# Patient Record
Sex: FEMALE | Race: BLACK OR AFRICAN AMERICAN | Employment: FULL TIME | URBAN - METROPOLITAN AREA
[De-identification: names, ages, dates, MRNs, and addresses within clinical notes are randomized per-mention and may not be internally consistent; named-entity substitution may affect disease eponyms.]

---

## 2017-01-05 RX ORDER — METOPROLOL SUCCINATE 25 MG/1
TABLET, EXTENDED RELEASE ORAL
Qty: 90 TAB | Refills: 1 | Status: SHIPPED | OUTPATIENT
Start: 2017-01-05 | End: 2017-09-25 | Stop reason: SDUPTHER

## 2017-01-12 ENCOUNTER — OFFICE VISIT (OUTPATIENT)
Dept: CARDIOLOGY CLINIC | Age: 32
End: 2017-01-12

## 2017-01-12 VITALS
SYSTOLIC BLOOD PRESSURE: 122 MMHG | HEIGHT: 61 IN | HEART RATE: 70 BPM | DIASTOLIC BLOOD PRESSURE: 76 MMHG | WEIGHT: 164.06 LBS | OXYGEN SATURATION: 99 % | RESPIRATION RATE: 16 BRPM | BODY MASS INDEX: 30.98 KG/M2

## 2017-01-12 DIAGNOSIS — R00.2 PALPITATIONS: Primary | ICD-10-CM

## 2017-01-12 NOTE — PROGRESS NOTES
Casper Mondragon NP  Subjective/HPI:     Kourtney Kauffman is a 32 y.o. female is here for routine f/u. The patient denies chest pain/ shortness of breath, orthopnea, PND, LE edema, palpitations, syncope, presyncope or fatigue. Patient was feeling in her usual state of health up until last week where she developed a GI virus symptomatic with more diarrhea abdominal cramping and nausea no vomiting. She noted during the time of her illness she had moderate amounts of fatigue some chest tightness not related to exertional activities noted increasing palpitations and feelings of shortness of breath. In the last 5 days her viral syndrome has improved there is a less frequency of palpitations no longer reports shortness of breath or chest pain. She brings with her today at home ECG monitoring noting underlying sinus rhythm with PACs at times. During her GI  illness she held Lasix due to loose bowel movements; she had resume Lasix and felt that her shortness of breath had resolved. PCP Provider  Cassandra Smith MD  Past Medical History   Diagnosis Date    FH: arrhythmogenic right ventricular cardiomyopathy      ARVD in maternal grandfather    HTN (hypertension)       History reviewed. No pertinent past surgical history. Allergies   Allergen Reactions    Ceclor [Cefaclor] Hives      Family History   Problem Relation Age of Onset    Heart Disease Maternal Grandfather       Current Outpatient Prescriptions   Medication Sig    metoprolol succinate (TOPROL-XL) 25 mg XL tablet Take 1 tablet by mouth  nightly    cyanocobalamin (VITAMIN B-12) 500 mcg tablet Take 500 mcg by mouth daily.  cholecalciferol, vitamin D3, (VITAMIN D3) 2,000 unit tab Take 2,000 Units by mouth daily.  NORETHINDRONE-ETHINYL ESTRAD (ALYACEN 1/35, 28, PO) Take  by mouth.  melatonin (MELATONIN) 5 mg cap capsule Take 10 mg by mouth nightly.  Takes as needed    valsartan-hydrochlorothiazide (DIOVAN-HCT) 80-12.5 mg per tablet Take 1 Tab by mouth daily.  furosemide (LASIX) 40 mg tablet Take 80 mg by mouth daily. No current facility-administered medications for this visit. Vitals:    01/12/17 1546 01/12/17 1556   BP: 118/70 122/76   Pulse: 70    Resp: 16    SpO2: 99%    Weight: 164 lb 1 oz (74.4 kg)    Height: 5' 1\" (1.549 m)      Social History     Social History    Marital status:      Spouse name: N/A    Number of children: N/A    Years of education: N/A     Occupational History    Not on file. Social History Main Topics    Smoking status: Never Smoker    Smokeless tobacco: Not on file    Alcohol use No    Drug use: No    Sexual activity: Not on file     Other Topics Concern    Not on file     Social History Narrative       I have reviewed the nurses notes, vitals, problem list, allergy list, medical history, family, social history and medications. Review of Symptoms:    General: Pt denies excessive weight gain or loss. Pt is able to conduct ADL's  HEENT: Denies blurred vision, headaches, epistaxis and difficulty swallowing. Respiratory: Denies shortness of breath, NIEVES, wheezing or stridor. Cardiovascular: Denies precordial pain, + palpitations, denies during her GI edema or PND  Gastrointestinal: Denies poor appetite, indigestion, abdominal pain or blood in stool  Musculoskeletal: Denies pain or swelling from muscles or joints  Neurologic: Denies tremor, paresthesias, or sensory motor disturbance  Skin: Denies rash, itching or texture change. Physical Exam:      General: Well developed, in no acute distress, cooperative and alert  HEENT: No carotid bruits, no JVD, trach is midline. Neck Supple, PEERL, EOM intact. Heart:  Normal S1/S2 negative S3 or S4. Regular, no murmur, gallop or rub.   Respiratory: Clear bilaterally x 4, no wheezing or rales  Abdomen:   Soft, non-tender, no masses, bowel sounds are active.   Extremities:  No edema, normal cap refill, no cyanosis, atraumatic.    Neuro: A&Ox3, speech clear, gait stable. Skin: Skin color is normal. No rashes or lesions. Non diaphoretic  Vascular: 2+ pulses symmetric in all extremities    Cardiographics    ECG: Normal sinus rhythm  No results found for this or any previous visit. Cardiology Labs:  No results found for: CHOL, CHOLX, CHLST, [address], [id number], HDL, LDL, DLDL, LDLC, DLDLP, TGL, TGLX, TRIGL, [id number], TRIGP, CHHD, CHHDX    No results found for: NA, K, CL, CO2, AGAP, GLU, BUN, CREA, BUCR, GFRAA, GFRNA, CA, TBIL, TBILI, GPT, SGOT, AP, TP, ALB, GLOB, AGRAT, ALT        Assessment:     Assessment:     Frances Perdue was seen today for palpitations. Diagnoses and all orders for this visit:    Palpitations  -     AMB POC EKG ROUTINE W/ 12 LEADS, INTER & REP  -     2D ECHO COMPLETE ADULT (TTE) W OR WO CONTR; Future        ICD-10-CM ICD-9-CM    1. Palpitations R00.2 785.1 AMB POC EKG ROUTINE W/ 12 LEADS, INTER & REP      2D ECHO COMPLETE ADULT (TTE) W OR WO CONTR     Orders Placed This Encounter    AMB POC EKG ROUTINE W/ 12 LEADS, INTER & REP     Order Specific Question:   Reason for Exam:     Answer:   routine    2D ECHO COMPLETE ADULT (TTE) W OR WO CONTR     Standing Status:   Future     Standing Expiration Date:   7/12/2017     Order Specific Question:   Reason for Exam:     Answer:   palpitations     Order Specific Question:   Is Patient Pregnant? Answer:   No     Order Specific Question:   Contrast Enhancement (Bubble Study, Definity, Optison) may be used if criteria listed in established evidence-based protocol has been identified. Answer:   Yes        Plan:     Patient presents doing well and is stable from cardiac stand point. Patient reports feeling back to baseline health palpitations have significantly improved. Continue current beta-blocker dose. Family history of ARVD echo performed last year primary care office reviewed; for echo this year for surveillance.     Ayaan Warner NP      [facility]    1/12/2017         Agree with note as outlined by  NP. I confirm findings in history and physical exam. No additional findings noted. Agree with plan as outlined above.      Saundra Rider MD

## 2017-01-12 NOTE — PROGRESS NOTES
3 month follow up. Patient states had stomach virus was having chest pain, palpitations and sob. Since then still has some palpitations and chest pain, sob has resolved.

## 2017-02-02 ENCOUNTER — CLINICAL SUPPORT (OUTPATIENT)
Dept: CARDIOLOGY CLINIC | Age: 32
End: 2017-02-02

## 2017-02-02 DIAGNOSIS — R00.2 PALPITATIONS: ICD-10-CM

## 2017-02-07 ENCOUNTER — TELEPHONE (OUTPATIENT)
Dept: CARDIOLOGY CLINIC | Age: 32
End: 2017-02-07

## 2017-02-07 NOTE — TELEPHONE ENCOUNTER
----- Message from Hailey Estrada LPN sent at 2/7/6839  3:41 PM EST -----      ----- Message -----     From: Marcus Dior MD     Sent: 2/7/2017  10:25 AM       To: Chely Paulson LPN    Echo normal, thx.      Called pt,verified pt with two pt identifiers, told pt that her echo is normal. She needs to f/u in 6 months. She verbalized that she understood everything.

## 2017-09-25 RX ORDER — METOPROLOL SUCCINATE 25 MG/1
TABLET, EXTENDED RELEASE ORAL
Qty: 90 TAB | Refills: 2 | Status: SHIPPED | OUTPATIENT
Start: 2017-09-25 | End: 2018-07-23 | Stop reason: SDUPTHER

## 2017-11-30 ENCOUNTER — OFFICE VISIT (OUTPATIENT)
Dept: CARDIOLOGY CLINIC | Age: 32
End: 2017-11-30

## 2017-11-30 VITALS
HEIGHT: 61 IN | HEART RATE: 90 BPM | SYSTOLIC BLOOD PRESSURE: 124 MMHG | BODY MASS INDEX: 32.7 KG/M2 | RESPIRATION RATE: 20 BRPM | WEIGHT: 173.2 LBS | OXYGEN SATURATION: 98 % | DIASTOLIC BLOOD PRESSURE: 64 MMHG

## 2017-11-30 DIAGNOSIS — R00.2 PALPITATIONS: Primary | ICD-10-CM

## 2017-11-30 DIAGNOSIS — R60.9 EDEMA, UNSPECIFIED TYPE: ICD-10-CM

## 2017-11-30 RX ORDER — NORETHINDRONE AND ETHINYL ESTRADIOL 1 MG-35MCG
1 KIT ORAL DAILY
COMMUNITY
Start: 2017-10-05

## 2017-11-30 NOTE — PROGRESS NOTES
NAME:  Juana Lester   :   1985   MRN:   [id number]   PCP:  Nannette Bumpers, MD           Subjective: The patient is a 28y.o. year old female  who returns for a routine follow-up. Since the last visit, patient reports no change in exercise tolerance, chest pain, edema, medication intolerance, palpitations, shortness of breath, PND/orthopnea wheezing, sputum, syncope, dizziness or light headedness. Doing well. Past Medical History:   Diagnosis Date    FH: arrhythmogenic right ventricular cardiomyopathy     ARVD in maternal grandfather    HTN (hypertension)         ICD-10-CM ICD-9-CM    1. Palpitations R00.2 785.1 AMB POC EKG ROUTINE W/ 12 LEADS, INTER & REP   2. Edema, unspecified type R60.9 782.3 2D ECHO COMPLETE ADULT (TTE) W OR WO CONTR      Social History   Substance Use Topics    Smoking status: Never Smoker    Smokeless tobacco: Never Used    Alcohol use No      Family History   Problem Relation Age of Onset    Heart Disease Maternal Grandfather         Review of Systems  General: Pt denies excessive weight gain or loss. Pt is able to conduct ADL's  HEENT: Denies blurred vision, headaches, epistaxis and difficulty swallowing. Respiratory: Denies shortness of breath, NIEVES, wheezing or stridor. Cardiovascular: Denies precordial pain, palpitations, edema or PND  Gastrointestinal: Denies poor appetite, indigestion, abdominal pain or blood in stool  Musculoskeletal: Denies pain or swelling from muscles or joints  Neurologic: Denies tremor, paresthesias, or sensory motor disturbance  Skin: Denies rash, itching or texture change. Objective:       Vitals:    17 1550 17 1600   BP: 124/60 124/64   Pulse: 90    Resp: 20    SpO2: 98%    Weight: 173 lb 3.2 oz (78.6 kg)    Height: 5' 1\" (1.549 m)     Body mass index is 32.73 kg/(m^2). General PE  Mental Status - Alert. General Appearance - Not in acute distress.     Chest and Lung Exam   Inspection: Accessory muscles - No use of accessory muscles in breathing. Auscultation:   Breath sounds: - Normal.    Cardiovascular   Inspection: Jugular vein - Bilateral - Inspection Normal.  Palpation/Percussion:   Apical Impulse: - Normal.  Auscultation: Rhythm - Regular. Heart Sounds - S1 WNL and S2 WNL. No S3 or S4. Murmurs & Other Heart Sounds: Auscultation of the heart reveals - No Murmurs. Peripheral Vascular   Upper Extremity: Inspection - Bilateral - No Cyanotic nailbeds or Digital clubbing. Lower Extremity:   Palpation: Edema - Bilateral - No edema. Data Review:     EKG -EKG: normal EKG, normal sinus rhythm, unchanged from previous tracings. Medications reviewed  Current Outpatient Prescriptions   Medication Sig    PIRMELLA 1-35 mg-mcg tab Take 1 Tab by mouth daily.  metoprolol succinate (TOPROL-XL) 25 mg XL tablet TAKE 1 TABLET BY MOUTH  NIGHTLY    cholecalciferol, vitamin D3, (VITAMIN D3) 2,000 unit tab Take 2,000 Units by mouth daily.  furosemide (LASIX) 40 mg tablet Take 80 mg by mouth daily.  cyanocobalamin (VITAMIN B-12) 500 mcg tablet Take 500 mcg by mouth daily.  melatonin (MELATONIN) 5 mg cap capsule Take 10 mg by mouth nightly. Takes as needed    valsartan-hydrochlorothiazide (DIOVAN-HCT) 80-12.5 mg per tablet Take 1 Tab by mouth daily. No current facility-administered medications for this visit. Assessment:       ICD-10-CM ICD-9-CM    1. Palpitations R00.2 785.1 AMB POC EKG ROUTINE W/ 12 LEADS, INTER & REP   2. Edema, unspecified type R60.9 782.3 2D ECHO COMPLETE ADULT (TTE) W OR WO CONTR        Plan:     Patient presents doing well and stable from cardiac standpoint. Discussed prn lasix for edema. Echo to check her RV. F/u on genetic testing for RV dysplasia. Continue current care and follow up in 6 months.     Lenin Kate MD

## 2017-11-30 NOTE — PROGRESS NOTES
1. Have you been to the ER, urgent care clinic since your last visit? Hospitalized since your last visit? NO    2. Have you seen or consulted any other health care providers outside of the [address] [place] [address] since your last visit? Include any pap smears or colon screening. YES PCP. C/O HEART PALPITATIONS OFF AND ON WITH CHEST PAIN, SWELLING IN BLE, SOB OCCASIONALLY.

## 2017-11-30 NOTE — MR AVS SNAPSHOT
Visit Information Date & Time Provider Department Dept. Phone Encounter #  
 11/30/2017  4:00 PM [address] [facility], [address] [place] [address] [facility] [phone] [id number] Upcoming Health Maintenance Date Due DTaP/Tdap/Td series (1 - Tdap) 10/27/2006 PAP AKA CERVICAL CYTOLOGY 10/27/2006 Influenza Age 5 to Adult 8/1/2017 Allergies as of 11/30/2017  Review Complete On: 11/30/2017 By: Elisha Tim LPN Severity Noted Reaction Type Reactions Ceclor [Cefaclor]  07/15/2016    Hives Current Immunizations  Never Reviewed No immunizations on file. Not reviewed this visit You Were Diagnosed With   
  
 Codes Comments Palpitations    -  Primary ICD-10-CM: R00.2 ICD-9-CM: 785.1 Vitals BP Pulse Resp Height(growth percentile) Weight(growth percentile) SpO2  
 124/64 (BP 1 Location: Right arm, BP Patient Position: Sitting) 90 20 5' 1\" (1.549 m) 173 lb 3.2 oz (78.6 kg) 98% BMI OB Status Smoking Status 32.73 kg/m2 Having regular periods Never Smoker Vitals History BMI and BSA Data Body Mass Index Body Surface Area 32.73 kg/m 2 1.84 m 2 Preferred Pharmacy Pharmacy Name Phone [address] [facility], [address] [address] [place] [address] [address] [facility] [place] [place] [facility] Your Updated Medication List  
  
   
This list is accurate as of: 11/30/17  4:19 PM.  Always use your most recent med list.  
  
  
  
  
 furosemide 40 mg tablet Commonly known as:  LASIX Take 80 mg by mouth daily. melatonin 5 mg Cap capsule Take 10 mg by mouth nightly. Takes as needed  
  
 metoprolol succinate 25 mg XL tablet Commonly known as:  TOPROL-XL  
TAKE 1 TABLET BY MOUTH  NIGHTLY PIRMELLA 1-35 mg-mcg Tab Generic drug:  norethindrone-ethinyl estradiol Take 1 Tab by mouth daily. valsartan-hydroCHLOROthiazide 80-12.5 mg per tablet Commonly known as:  DIOVAN-HCT Take 1 Tab by mouth daily. VITAMIN B-12 500 mcg tablet Generic drug:  cyanocobalamin Take 500 mcg by mouth daily. VITAMIN D3 2,000 unit Tab Generic drug:  cholecalciferol (vitamin D3) Take 2,000 Units by mouth daily. We Performed the Following AMB POC EKG ROUTINE W/ 12 LEADS, INTER & REP [79939 CPT(R)] Introducing [facility]! Quirnio Poe introduces Quippi patient portal. Now you can access parts of your medical record, email your doctor's office, and request medication refills online. 1. In your internet browser, go to [email or web address] 2. Click on the First Time User? Click Here link in the Sign In box. You will see the New Member Sign Up page. 3. Enter your Quippi Access Code exactly as it appears below. You will not need to use this code after youve completed the sign-up process. If you do not sign up before the expiration date, you must request a new code. · Quippi Access Code: [email or web address] Expires: 2/28/2018  3:46 PM 
 
4. Enter the last four digits of your Social Security Number (xxxx) and Date of Birth (mm/dd/yyyy) as indicated and click Submit. You will be taken to the next sign-up page. 5. Create a Quippi ID. This will be your Quippi login ID and cannot be changed, so think of one that is secure and easy to remember. 6. Create a Quippi password. You can change your password at any time. 7. Enter your Password Reset Question and Answer. This can be used at a later time if you forget your password. 8. Enter your e-mail address. You will receive e-mail notification when new information is available in [address] 9. Click Sign Up. You can now view and download portions of your medical record. 10. Click the Download Summary menu link to download a portable copy of your medical information. If you have questions, please visit the Frequently Asked Questions section of the Quippi website.  Remember, Quippi is NOT to be used for urgent needs. For medical emergencies, dial 911. Now available from your iPhone and Android! Please provide this summary of care documentation to your next provider. Your primary care clinician is listed as Gwen Medrano. If you have any questions after today's visit, please call [phone].

## 2017-12-14 ENCOUNTER — CLINICAL SUPPORT (OUTPATIENT)
Dept: CARDIOLOGY CLINIC | Age: 32
End: 2017-12-14

## 2017-12-14 DIAGNOSIS — R60.9 EDEMA, UNSPECIFIED TYPE: ICD-10-CM

## 2018-10-26 ENCOUNTER — OFFICE VISIT (OUTPATIENT)
Dept: CARDIOLOGY CLINIC | Age: 33
End: 2018-10-26

## 2018-10-26 ENCOUNTER — HOSPITAL ENCOUNTER (EMERGENCY)
Age: 33
Discharge: STILL ADMITTED OR ADMINISTRATIVE | End: 2018-10-26
Attending: EMERGENCY MEDICINE
Payer: COMMERCIAL

## 2018-10-26 ENCOUNTER — HOSPITAL ENCOUNTER (OUTPATIENT)
Dept: GENERAL RADIOLOGY | Age: 33
Discharge: HOME | End: 2018-10-26
Payer: COMMERCIAL

## 2018-10-26 VITALS
OXYGEN SATURATION: 99 % | WEIGHT: 181.7 LBS | HEIGHT: 61 IN | DIASTOLIC BLOOD PRESSURE: 82 MMHG | RESPIRATION RATE: 16 BRPM | SYSTOLIC BLOOD PRESSURE: 124 MMHG | BODY MASS INDEX: 34.31 KG/M2 | HEART RATE: 83 BPM

## 2018-10-26 VITALS
OXYGEN SATURATION: 100 % | RESPIRATION RATE: 18 BRPM | DIASTOLIC BLOOD PRESSURE: 74 MMHG | HEIGHT: 61 IN | WEIGHT: 182.76 LBS | BODY MASS INDEX: 34.51 KG/M2 | TEMPERATURE: 98.6 F | HEART RATE: 72 BPM | SYSTOLIC BLOOD PRESSURE: 129 MMHG

## 2018-10-26 DIAGNOSIS — I50.31 ACUTE DIASTOLIC CHF (CONGESTIVE HEART FAILURE) (HCC): Primary | ICD-10-CM

## 2018-10-26 DIAGNOSIS — I50.31 ACUTE DIASTOLIC CHF (CONGESTIVE HEART FAILURE) (HCC): ICD-10-CM

## 2018-10-26 DIAGNOSIS — R00.2 PALPITATIONS: ICD-10-CM

## 2018-10-26 PROCEDURE — 71046 X-RAY EXAM CHEST 2 VIEWS: CPT

## 2018-10-26 PROCEDURE — 75810000275 HC EMERGENCY DEPT VISIT NO LEVEL OF CARE

## 2018-10-26 RX ORDER — IBUPROFEN 200 MG
TABLET ORAL
COMMUNITY

## 2018-10-26 RX ORDER — NORETHINDRONE ACETATE AND ETHINYL ESTRADIOL, AND FERROUS FUMARATE 1MG-20(24)
KIT ORAL
COMMUNITY

## 2018-10-26 NOTE — PROGRESS NOTES
1. Have you been to the ER, urgent care clinic since your last visit? Hospitalized since your last visit? Yes, for abd pain in July 2018 at [facility]. 2. Have you seen or consulted any other health care providers outside of the [address] [place] [address] since your last visit? Include any pap smears or colon screening.   No    Chief Complaint   Patient presents with    Palpitations     pt c/o palpitations x 2-1/2 wks with deep urge to cough, nonproductive    Shortness of Breath     x 2-3 wks    Chest Pain     pt c/o steady, nagging pain to chest x 1-1/2 wks with pain to back     Fatigue    Leg Swelling     bilateral    Weight Gain     pt reports weight gain of 5-10 lbs in the last 3 wks

## 2018-10-26 NOTE — PROGRESS NOTES
NAME:  Abbe Ferrari   :   1985   MRN:   [id number]   PCP:  Tessa Gamboa NP           Subjective: The patient is a 28y.o. year old female  who returns for a routine follow-up. Since the last visit, patient reports no change in exercise tolerance, chest pain,  medication intolerance, palpitations,  PND/orthopnea wheezing, sputum, syncope, dizziness or light headedness. C/o edema, SOB. No improvement with higher dose of lasix. Past Medical History:   Diagnosis Date    FH: arrhythmogenic right ventricular cardiomyopathy     ARVD in maternal grandfather    HTN (hypertension)         ICD-10-CM ICD-9-CM    1. Acute diastolic CHF (congestive heart failure) (HCC) I50.31 428.31 2D ECHO COMPLETE ADULT (TTE) W OR WO CONTR     428.0 XR CHEST PA LAT      METABOLIC PANEL, BASIC      NT-PRO BNP   2. Palpitations R00.2 785.1 AMB POC EKG ROUTINE W/ 12 LEADS, INTER & REP      Social History     Tobacco Use    Smoking status: Never Smoker    Smokeless tobacco: Never Used   Substance Use Topics    Alcohol use: No     Alcohol/week: 0.0 oz      Family History   Problem Relation Age of Onset    Heart Disease Maternal Grandfather         Review of Systems  General: Pt denies excessive weight gain or loss. Pt is able to conduct ADL's  HEENT: Denies blurred vision, headaches, epistaxis and difficulty swallowing. Respiratory: Denies shortness of breath, NIEVES, wheezing or stridor. Cardiovascular: Denies precordial pain, palpitations, edema or PND  Gastrointestinal: Denies poor appetite, indigestion, abdominal pain or blood in stool  Musculoskeletal: Denies pain or swelling from muscles or joints  Neurologic: Denies tremor, paresthesias, or sensory motor disturbance  Skin: Denies rash, itching or texture change.     Objective:       Vitals:    10/26/18 1046 10/26/18 1105   BP: 124/80 124/82   Pulse: 83    Resp: 16    SpO2: 99%    Weight: 181 lb 11.2 oz (82.4 kg)    Height: 5' 1\" (1.549 m)     Body mass index is 34.33 kg/m². General PE  Mental Status - Alert. General Appearance - Not in acute distress. Chest and Lung Exam   Inspection: Accessory muscles - No use of accessory muscles in breathing. Auscultation:   Breath sounds: - Normal.    Cardiovascular   Inspection: Jugular vein - Bilateral - Inspection Normal.  Palpation/Percussion:   Apical Impulse: - Normal.  Auscultation: Rhythm - Regular. Heart Sounds - S1 WNL and S2 WNL. No S3 or S4. Murmurs & Other Heart Sounds: Auscultation of the heart reveals - No Murmurs. Peripheral Vascular   Upper Extremity: Inspection - Bilateral - No Cyanotic nailbeds or Digital clubbing. Lower Extremity:   Palpation: Edema - Bilateral - No edema. Data Review:     EKG -EKG: normal EKG, normal sinus rhythm, unchanged from previous tracings. Medications reviewed  Current Outpatient Medications   Medication Sig    norethindrone-e.estradiol-iron (TAYTULLA) 1 mg-20 mcg (24)/75 mg (4) cap Take  by mouth.  ibuprofen (MOTRIN IB) 200 mg tablet Take  by mouth.  metoprolol succinate (TOPROL-XL) 25 mg XL tablet TAKE 1 TABLET BY MOUTH  NIGHTLY    furosemide (LASIX) 40 mg tablet Take 80 mg by mouth daily.  PIRMELLA 1-35 mg-mcg tab Take 1 Tab by mouth daily.  cyanocobalamin (VITAMIN B-12) 500 mcg tablet Take 500 mcg by mouth daily.  cholecalciferol, vitamin D3, (VITAMIN D3) 2,000 unit tab Take 2,000 Units by mouth daily.  melatonin 5 mg cap capsule Take 10 mg by mouth nightly. Takes as needed    valsartan-hydrochlorothiazide (DIOVAN-HCT) 80-12.5 mg per tablet Take 1 Tab by mouth daily. No current facility-administered medications for this visit. Assessment:       ICD-10-CM ICD-9-CM    1. Acute diastolic CHF (congestive heart failure) (HCC) I50.31 428.31 2D ECHO COMPLETE ADULT (TTE) W OR WO CONTR     428.0 XR CHEST PA LAT      METABOLIC PANEL, BASIC      NT-PRO BNP   2.  Palpitations R00.2 785.1 AMB POC EKG ROUTINE W/ 12 LEADS, INTER & REP        Plan:     Patient presents with what appears to be diastolic heart failure. Physical exam normal.  Advised to increase her lasix dose. Check echo,labs, xray. Advised to f/u with genetic testing at [facility] due to family h/o RV dysplasia. Continue current care and follow up in 2 weeks.     Augustus Sicard, MD

## 2018-10-27 LAB
BUN SERPL-MCNC: 11 MG/DL (ref 6–20)
BUN/CREAT SERPL: 12 (ref 9–23)
CALCIUM SERPL-MCNC: 9.2 MG/DL (ref 8.7–10.2)
CHLORIDE SERPL-SCNC: 103 MMOL/L (ref 96–106)
CO2 SERPL-SCNC: 25 MMOL/L (ref 20–29)
CREAT SERPL-MCNC: 0.93 MG/DL (ref 0.57–1)
GLUCOSE SERPL-MCNC: 88 MG/DL (ref 65–99)
NT-PROBNP SERPL-MCNC: 27 PG/ML (ref 0–130)
POTASSIUM SERPL-SCNC: 4.3 MMOL/L (ref 3.5–5.2)
SODIUM SERPL-SCNC: 141 MMOL/L (ref 134–144)

## 2018-11-01 ENCOUNTER — CLINICAL SUPPORT (OUTPATIENT)
Dept: CARDIOLOGY CLINIC | Age: 33
End: 2018-11-01

## 2018-11-01 DIAGNOSIS — I50.31 ACUTE DIASTOLIC CHF (CONGESTIVE HEART FAILURE) (HCC): ICD-10-CM

## 2018-11-14 NOTE — PROGRESS NOTES
Spoke with patient regarding ECHO results. Verified patient with two identifiers. Gabriele Marrero, pt will need a f/u apt with Dr. Kwame Ryan.   Thanks

## 2019-04-24 ENCOUNTER — TELEPHONE (OUTPATIENT)
Dept: CARDIOLOGY CLINIC | Age: 34
End: 2019-04-24

## 2019-04-24 NOTE — TELEPHONE ENCOUNTER
Pt called stating she has been having chest pain since yesterday afternoon. Pt states she has also had intermittent palpitations. Pt stated her chest pain is 8 out of 10 right now. Pt is at work and does not want to go to the ER due to financial constraints. Pt was instructed to go to [facility] if she can not go to the ER to be evaluated and get lab work to rule out PE and check troponin (per Elder Alonso). Pt agreed to do this.

## 2019-06-25 RX ORDER — METOPROLOL SUCCINATE 25 MG/1
TABLET, EXTENDED RELEASE ORAL
Qty: 90 TAB | Refills: 3 | Status: SHIPPED | OUTPATIENT
Start: 2019-06-25 | End: 2020-06-01 | Stop reason: SDUPTHER

## 2020-06-01 ENCOUNTER — VIRTUAL VISIT (OUTPATIENT)
Dept: CARDIOLOGY CLINIC | Age: 35
End: 2020-06-01

## 2020-06-01 VITALS
BODY MASS INDEX: 36.66 KG/M2 | DIASTOLIC BLOOD PRESSURE: 82 MMHG | WEIGHT: 194 LBS | HEART RATE: 90 BPM | SYSTOLIC BLOOD PRESSURE: 126 MMHG

## 2020-06-01 DIAGNOSIS — I50.33 ACUTE ON CHRONIC DIASTOLIC CONGESTIVE HEART FAILURE (HCC): Primary | ICD-10-CM

## 2020-06-01 DIAGNOSIS — R00.2 PALPITATIONS: ICD-10-CM

## 2020-06-01 NOTE — PROGRESS NOTES
Cardiology Telemedicine Encounter    Brianna Cano is a 29 y.o. female who was seen by synchronous (real-time) audio-video technology on 6/1/2020           Subjective/HPI:     Brianna Cano is a 29 y.o. female is here for routine follow-up via virtual visit. She has a PMHx of diastolic HF, palpitations. Has family h/o ARCD and her genetic testing was negative. C/o increasing SOB, edema, weight gain. Palpitations were better with beta blocker but now getting worse. PCP Provider  Brabara Deleon NP    Past Medical History:   Diagnosis Date    FH: arrhythmogenic right ventricular cardiomyopathy     ARVD in maternal grandfather    HTN (hypertension)         Allergies   Allergen Reactions    Ceclor [Cefaclor] Hives    Codeine Other (comments)     Lightheaded and dizziness        Outpatient Encounter Medications as of 6/1/2020   Medication Sig Dispense Refill    metoprolol succinate (TOPROL-XL) 25 mg XL tablet TAKE 1 TABLET BY MOUTH  NIGHTLY 90 Tab 3    PIRMELLA 1-35 mg-mcg tab Take 1 Tab by mouth daily.  furosemide (LASIX) 40 mg tablet Take 80 mg by mouth daily.  norethindrone-e.estradiol-iron (TAYTULLA) 1 mg-20 mcg (24)/75 mg (4) cap Take  by mouth.  ibuprofen (MOTRIN IB) 200 mg tablet Take  by mouth.  cyanocobalamin (VITAMIN B-12) 500 mcg tablet Take 500 mcg by mouth daily.  cholecalciferol, vitamin D3, (VITAMIN D3) 2,000 unit tab Take 2,000 Units by mouth daily.  melatonin 5 mg cap capsule Take 10 mg by mouth nightly. Takes as needed      valsartan-hydrochlorothiazide (DIOVAN-HCT) 80-12.5 mg per tablet Take 1 Tab by mouth daily. No facility-administered encounter medications on file as of 6/1/2020. Review of Symptoms:    Review of Systems   Constitutional: Negative. Negative for chills and fever. HENT: Negative. Negative for hearing loss. Respiratory: Negative. Negative for cough, hemoptysis and shortness of breath. Cardiovascular: Positive for palpitations and leg swelling. Negative for chest pain, orthopnea, claudication and PND. Gastrointestinal: Negative. Negative for nausea and vomiting. Musculoskeletal: Negative for myalgias. Skin: Negative. Negative for rash. Neurological: Negative. Negative for dizziness, loss of consciousness and headaches. Physical Exam:      General: Well developed, in no acute distress, cooperative and alert  HEENT: trach is midline. PEERL, EOM intact. Respiratory: No audible wheezing, no acute respiratory distress, normal effort of breathing  Extremities:  No cyanosis, atraumatic. No lower extremity edema. Neuro: A&Ox3, speech clear  Skin: Skin color is normal. No rashes or lesions. Non diaphoretic  Due to this being a TeleHealth evaluation, many elements of the physical examination are unable to be assessed. Cardiology Labs:    No results found for: FLP, CHOL, HDL, LDLC, VLDL, CHHD    No results found for: HBA1C, DUH8QQRC, CDS5SVBK    Lab Results   Component Value Date/Time    Sodium 141 10/26/2018 11:44 AM    Potassium 4.3 10/26/2018 11:44 AM    Chloride 103 10/26/2018 11:44 AM    CO2 25 10/26/2018 11:44 AM    Glucose 88 10/26/2018 11:44 AM    BUN 11 10/26/2018 11:44 AM    Creatinine 0.93 10/26/2018 11:44 AM    BUN/Creatinine ratio 12 10/26/2018 11:44 AM    GFR est AA 94 10/26/2018 11:44 AM    GFR est non-AA 82 10/26/2018 11:44 AM    Calcium 9.2 10/26/2018 11:44 AM          Assessment:       ICD-10-CM ICD-9-CM    1. Acute on chronic diastolic congestive heart failure (HCC) I50.33 428.33 ECHO ADULT COMPLETE     428.0    2. Palpitations R00.2 785.1 LOOP MONITOR        Plan:     1. Acute on chronic diastolic congestive heart failure (HCC)  Decompensated. Advised to increase lasix to 80 mg daily for 3-4 days and then 40 mg daily. May need to consider bumex if she does not tolerate lasix. Check echo. Consider stress test.  - ECHO ADULT COMPLETE; Future    2. Palpitations  Will evaluate with monitor before considering additional therapy.  - LOOP MONITOR; Future    F/u in 2-3 weeks after all tests. Chiqui Estrada MD        This visit was completed using [employer] Virtual Visit telemedicine services    Pursuant to the emergency declaration under the [facility], [facility] waiver authority and the Coronavirus Preparedness and [employer] Act, this Virtual Visit was conducted, with patient's consent, to reduce the patient's risk of exposure to COVID-19 and provide continuity of care for an established patient. Services were provided through a video synchronous discussion virtually to substitute for in-person clinic visit.

## 2020-06-01 NOTE — PROGRESS NOTES
Chief Complaint   Patient presents with    Follow-up    Palpitations    CHF       1. Have you been to the ER, urgent care clinic since your last visit? Hospitalized since your last visit? Yes     2. Have you seen or consulted any other health care providers outside of the [address] [place] [address] since your last visit? Include any pap smears or colon screening. Angel Paez GYN    Patient requesting refill of Metoprolol to mail order. She C/O SOB and chest discomfort with strenuous activity , palpitations and fluid build up.

## 2020-06-02 ENCOUNTER — CLINICAL SUPPORT (OUTPATIENT)
Dept: CARDIOLOGY CLINIC | Age: 35
End: 2020-06-02

## 2020-06-02 DIAGNOSIS — R00.2 PALPITATIONS: ICD-10-CM

## 2020-06-02 RX ORDER — METOPROLOL SUCCINATE 25 MG/1
25 TABLET, EXTENDED RELEASE ORAL DAILY
Qty: 90 TAB | Refills: 3 | Status: SHIPPED | OUTPATIENT
Start: 2020-06-02

## 2020-06-02 NOTE — PROGRESS NOTES
Patient received a 2 week event monitor. Instructions given verbally as well as an instruction sheet. Pt verbalized understanding.     [facility] Event Monitoring

## 2023-03-29 ENCOUNTER — TELEPHONE (OUTPATIENT)
Dept: INTERNAL MEDICINE CLINIC | Age: 38
End: 2023-03-29

## 2023-03-29 NOTE — TELEPHONE ENCOUNTER
Spoke w/ pt, informed pt Dr. Bertell Habermann is not accepting NP. Pt stated they will look at other offices before calling back to schedule with one of our other providers. Pt will call back when ready to schedule with our office.

## 2023-03-29 NOTE — TELEPHONE ENCOUNTER
----- Message from Dejah Jones sent at 3/29/2023  9:17 AM EDT -----  Subject: Appointment Request    Reason for Call: New Patient/New to Provider Appointment needed: New   Patient Request Appointment    QUESTIONS    Reason for appointment request? Requested Provider unavailable - Christian Hernandez     Additional Information for Provider? Patient wants to establish with Dr. Nolan Muniz because that is who her mom sees as her dr. Would she take her   as a new patient? When could she come in?  Please advise.  ---------------------------------------------------------------------------  --------------  Martinez GARCIA  [phone]; OK to leave message on voicemail  ---------------------------------------------------------------------------  --------------  SCRIPT ANSWERS  COVID Screen: [place] Record

## 2023-10-09 ENCOUNTER — OFFICE VISIT (OUTPATIENT)
Age: 38
End: 2023-10-09
Payer: COMMERCIAL

## 2023-10-09 VITALS
RESPIRATION RATE: 16 BRPM | DIASTOLIC BLOOD PRESSURE: 80 MMHG | HEART RATE: 80 BPM | HEIGHT: 60 IN | SYSTOLIC BLOOD PRESSURE: 130 MMHG | OXYGEN SATURATION: 100 % | WEIGHT: 200 LBS | BODY MASS INDEX: 39.27 KG/M2

## 2023-10-09 DIAGNOSIS — J45.20 MILD INTERMITTENT ASTHMA WITHOUT COMPLICATION: ICD-10-CM

## 2023-10-09 DIAGNOSIS — R63.5 UNINTENDED WEIGHT GAIN: ICD-10-CM

## 2023-10-09 DIAGNOSIS — I49.3 PVC'S (PREMATURE VENTRICULAR CONTRACTIONS): ICD-10-CM

## 2023-10-09 DIAGNOSIS — R00.2 PALPITATIONS: ICD-10-CM

## 2023-10-09 DIAGNOSIS — R60.0 EDEMA OF BOTH LOWER EXTREMITIES: Primary | ICD-10-CM

## 2023-10-09 DIAGNOSIS — Z82.49 FAMILY HISTORY OF ARRHYTHMOGENIC RIGHT VENTRICULAR CARDIOMYOPATHY: ICD-10-CM

## 2023-10-09 DIAGNOSIS — R06.02 SHORTNESS OF BREATH: ICD-10-CM

## 2023-10-09 PROCEDURE — 93000 ELECTROCARDIOGRAM COMPLETE: CPT | Performed by: INTERNAL MEDICINE

## 2023-10-09 PROCEDURE — 99204 OFFICE O/P NEW MOD 45 MIN: CPT | Performed by: INTERNAL MEDICINE

## 2023-10-09 RX ORDER — DROSPIRENONE 4 MG/1
TABLET, FILM COATED ORAL
COMMUNITY

## 2023-10-09 RX ORDER — ALBUTEROL SULFATE 1.25 MG/3ML
SOLUTION RESPIRATORY (INHALATION)
COMMUNITY
Start: 2023-08-20

## 2023-10-09 RX ORDER — ALBUTEROL SULFATE 90 UG/1
AEROSOL, METERED RESPIRATORY (INHALATION)
COMMUNITY
Start: 2023-08-20

## 2023-10-09 RX ORDER — FUROSEMIDE 40 MG/1
40 TABLET ORAL DAILY
Qty: 90 TABLET | Refills: 2 | Status: SHIPPED | OUTPATIENT
Start: 2023-10-09

## 2023-10-09 NOTE — PATIENT INSTRUCTIONS
You will be schedule for an Echocardiogram in the near future. Please take Lasix to 40 mg once a day. Get your blood work done in a month.

## 2023-10-09 NOTE — PROGRESS NOTES
normal. No rashes or lesions. Non diaphoretic  Vascular: 2+ pulses symmetric in all extremities, trace pitting edema    Labs:   CBC   No results found for: \"WBC\", \"HGB\", \"HCT\", \"MCV\", \"PLT\"    CMP  No results found for: \"NA\", \"K\", \"CL\", \"CO2\", \"BUN\", \"CREATININE\", \"GLUCOSE\", \"CALCIUM\", \"PROT\", \"LABALBU\", \"BILITOT\", \"ALKPHOS\", \"AST\", \"ALT\", \"LABGLOM\", \"GFRAA\", \"AGRATIO\", \"GLOB\"    INR  No results found for: \"INR\", \"PROTIME\"     LIPIDS  No results found for: \"LIPIDPAN\"      Lipids No results found for: \"CHOL\", \"TRIG\", \"HDL\", \"LDLCALC\", \"LABVLDL\", \"CHOLHDLRATIO\"      ECHO:  No results found for this or any previous visit. STRESS:  No results found for this or any previous visit. CARDIAC CATH  No results found for this or any previous visit. EKG:  NSR      Assessment:         Diagnosis Orders   1. Edema of both lower extremities        2. Palpitations  EKG 12 Lead      3. PVC's (premature ventricular contractions)            Orders Placed This Encounter   Procedures    EKG 12 Lead     Order Specific Question:   Reason for Exam?     Answer: Other        Plan:     Possible acute on chronic chronic HFpEF:  -Last echo I can see was in November 2018 with LVEF 55 to 60%, normal wall thickness, no significant valvular pathology  -Repeat echo  -If no signs of diastolic heart failure, venous insufficiency due to overweight is also on the differential diagnosis of her lower extremity edema  -Restart Lasix 40 mg daily which she took for many years, repeat BMP, magnesium, proBNP in about a month    Palpitations  Event monitor in June 2020 showed single PVCs.     Family history of arrhythmogenic right ventricular cardiomyopathy in her grandfather:  -Patient reports that her mother tested positive for an abnormal gene that can code for abnormal proteins leading to [address] mother is also my patient and was confirmed to have a suspicious gene, but has not showed any of the characteristics of ARVC herself  -The patient

## 2023-10-31 ENCOUNTER — TELEPHONE (OUTPATIENT)
Age: 38
End: 2023-10-31

## 2023-10-31 NOTE — TELEPHONE ENCOUNTER
----- Message from Jose D Marie MD sent at 10/29/2023  6:04 PM EDT -----  Heart is strong, without valve problems or signs of weak heart or stiff heart. Swelling likely relates to weight and/or sodium intake. There may be a component of leaky leg veins called venous insufficiency, and if very bothersome she could see a vascular surgeon for this. Work hard on healthy diet and exercise and sodium restriction, and if doing well, follow-up as planned. Message left for patient to call us back.

## 2023-11-10 ENCOUNTER — OFFICE VISIT (OUTPATIENT)
Age: 38
End: 2023-11-10

## 2023-11-10 VITALS
OXYGEN SATURATION: 100 % | BODY MASS INDEX: 39.31 KG/M2 | SYSTOLIC BLOOD PRESSURE: 133 MMHG | WEIGHT: 201.3 LBS | DIASTOLIC BLOOD PRESSURE: 91 MMHG | TEMPERATURE: 98.8 F | HEART RATE: 69 BPM

## 2023-11-10 DIAGNOSIS — B96.89 ACUTE BACTERIAL SINUSITIS: Primary | ICD-10-CM

## 2023-11-10 DIAGNOSIS — J01.90 ACUTE BACTERIAL SINUSITIS: Primary | ICD-10-CM

## 2023-11-10 RX ORDER — DOXYCYCLINE HYCLATE 100 MG
100 TABLET ORAL 2 TIMES DAILY
Qty: 14 TABLET | Refills: 0 | Status: SHIPPED | OUTPATIENT
Start: 2023-11-10 | End: 2023-11-17

## 2023-11-10 NOTE — PATIENT INSTRUCTIONS
Doxycycline as ordered, 2x per day for 7 days  Continue inhalers and breathing treatments for wheezing  OTC treatments: hot tea with honey, saline sinus rinses, warm compresses  Return if symptoms persist or worsen

## 2024-01-11 ENCOUNTER — OFFICE VISIT (OUTPATIENT)
Age: 39
End: 2024-01-11

## 2024-01-11 VITALS
WEIGHT: 198 LBS | RESPIRATION RATE: 20 BRPM | DIASTOLIC BLOOD PRESSURE: 82 MMHG | BODY MASS INDEX: 38.87 KG/M2 | HEART RATE: 88 BPM | OXYGEN SATURATION: 99 % | HEIGHT: 60 IN | SYSTOLIC BLOOD PRESSURE: 120 MMHG

## 2024-01-11 DIAGNOSIS — I49.3 PVC (PREMATURE VENTRICULAR CONTRACTION): ICD-10-CM

## 2024-01-11 DIAGNOSIS — R00.2 HEART PALPITATIONS: Primary | ICD-10-CM

## 2024-01-11 DIAGNOSIS — R60.0 LOWER EXTREMITY EDEMA: ICD-10-CM

## 2024-01-11 NOTE — PROGRESS NOTES
[place]: Jose  [phone]      History of Present Illness:  Ms. Rosales is a 39 yo F with family history of ARVC, but she did genetic testing and was negative for that, seen previously by Dr. Morel, but wanted to follow with me.  She also saw Dr. Teague in the past and at some point wore a heart monitor for palpitations that demonstrated PVCs.  At one point, she was on beta blocker for this, but is no longer.  A few months ago when she saw Dr. Davidson for fluid retention in her legs and hands they obtained an echocardiogram.  This was normal with normal systolic and diastolic function and no significant valvular disease and we discussed the results.  She has been taking Lasix for this.  She also has been undergoing testing for sleep apnea and had a sleep study on Monday and is awaiting the results.  She does have fatigue that they think may be related.  She does have some baseline shortness of breath she relates to asthma.  No exertional chest pain.  She is compensated on exam with clear lungs and just trace lower extremity edema.      Assessment and Plan:   1. Lower extremity edema.  No evidence of cardiac contribution and her echocardiogram demonstrated normal systolic and diastolic function.  She does have the results of the sleep apnea testing that she is awaiting.  Likely some degree of venous insufficiency and it is reasonable to continue Lasix.  Otherwise, talked about minimizing salt intake, which she already does, elevating her legs at rest and regular exercise and activity.  No additional cardiac evaluation is indicated at this time and we will have her follow back in one year.    2. Heart palpitations, PVCs.  These occur, but are not that bothersome and they are infrequent.  We talked about possible beta blocker or medical therapy if they became bothersome.  Medication would be indicated for symptom relief.  She did wear a Holter in 2020 that showed less than 1% PVCs/PACs.    3.

## 2024-06-06 ENCOUNTER — APPOINTMENT (OUTPATIENT)
Facility: HOSPITAL | Age: 39
End: 2024-06-06
Payer: COMMERCIAL

## 2024-06-06 ENCOUNTER — HOSPITAL ENCOUNTER (EMERGENCY)
Facility: HOSPITAL | Age: 39
Discharge: HOME | End: 2024-06-06
Attending: EMERGENCY MEDICINE
Payer: COMMERCIAL

## 2024-06-06 VITALS
TEMPERATURE: 98.3 F | WEIGHT: 207.23 LBS | HEART RATE: 78 BPM | OXYGEN SATURATION: 99 % | SYSTOLIC BLOOD PRESSURE: 132 MMHG | HEIGHT: 60 IN | RESPIRATION RATE: 18 BRPM | DIASTOLIC BLOOD PRESSURE: 85 MMHG | BODY MASS INDEX: 40.69 KG/M2

## 2024-06-06 DIAGNOSIS — K62.5 RECTAL BLEEDING: Primary | ICD-10-CM

## 2024-06-06 DIAGNOSIS — N20.0 KIDNEY STONE: ICD-10-CM

## 2024-06-06 DIAGNOSIS — N30.00 ACUTE CYSTITIS WITHOUT HEMATURIA: ICD-10-CM

## 2024-06-06 LAB
ALBUMIN SERPL-MCNC: 3.5 G/DL (ref 3.5–5)
ALBUMIN/GLOB SERPL: 0.8 (ref 1.1–2.2)
ALP SERPL-CCNC: 93 U/L (ref 45–117)
ALT SERPL-CCNC: 22 U/L (ref 12–78)
ANION GAP SERPL CALC-SCNC: 4 MMOL/L (ref 5–15)
APPEARANCE UR: ABNORMAL
AST SERPL-CCNC: ABNORMAL U/L (ref 15–37)
BACTERIA URNS QL MICRO: ABNORMAL /HPF
BASOPHILS # BLD: 0 K/UL (ref 0–0.1)
BASOPHILS NFR BLD: 1 % (ref 0–1)
BILIRUB SERPL-MCNC: 0.5 MG/DL (ref 0.2–1)
BILIRUB UR QL: NEGATIVE
BUN SERPL-MCNC: 10 MG/DL (ref 6–20)
BUN/CREAT SERPL: 10 (ref 12–20)
CALCIUM SERPL-MCNC: 9.4 MG/DL (ref 8.5–10.1)
CHLORIDE SERPL-SCNC: 106 MMOL/L (ref 97–108)
CO2 SERPL-SCNC: 25 MMOL/L (ref 21–32)
COLOR UR: ABNORMAL
COMMENT:: NORMAL
CREAT SERPL-MCNC: 0.98 MG/DL (ref 0.55–1.02)
DIFFERENTIAL METHOD BLD: NORMAL
EOSINOPHIL # BLD: 0 K/UL (ref 0–0.4)
EOSINOPHIL NFR BLD: 1 % (ref 0–7)
EPITH CASTS URNS QL MICRO: ABNORMAL /LPF
ERYTHROCYTE [DISTWIDTH] IN BLOOD BY AUTOMATED COUNT: 14 % (ref 11.5–14.5)
GLOBULIN SER CALC-MCNC: 4.3 G/DL (ref 2–4)
GLUCOSE SERPL-MCNC: 81 MG/DL (ref 65–100)
GLUCOSE UR STRIP.AUTO-MCNC: NEGATIVE MG/DL
HCG SERPL QL: NEGATIVE
HCG UR QL: NEGATIVE
HCT VFR BLD AUTO: 39.2 % (ref 35–47)
HEMOCCULT STL QL: NEGATIVE
HGB BLD-MCNC: 12.8 G/DL (ref 11.5–16)
HGB UR QL STRIP: NEGATIVE
IMM GRANULOCYTES # BLD AUTO: 0 K/UL (ref 0–0.04)
IMM GRANULOCYTES NFR BLD AUTO: 0 % (ref 0–0.5)
KETONES UR QL STRIP.AUTO: ABNORMAL MG/DL
LEUKOCYTE ESTERASE UR QL STRIP.AUTO: NEGATIVE
LIPASE SERPL-CCNC: 22 U/L (ref 13–75)
LYMPHOCYTES # BLD: 2 K/UL (ref 0.8–3.5)
LYMPHOCYTES NFR BLD: 44 % (ref 12–49)
MCH RBC QN AUTO: 27.6 PG (ref 26–34)
MCHC RBC AUTO-ENTMCNC: 32.7 G/DL (ref 30–36.5)
MCV RBC AUTO: 84.7 FL (ref 80–99)
MONOCYTES # BLD: 0.4 K/UL (ref 0–1)
MONOCYTES NFR BLD: 9 % (ref 5–13)
NEUTS SEG # BLD: 2.1 K/UL (ref 1.8–8)
NEUTS SEG NFR BLD: 45 % (ref 32–75)
NITRITE UR QL STRIP.AUTO: NEGATIVE
NRBC # BLD: 0 K/UL (ref 0–0.01)
NRBC BLD-RTO: 0 PER 100 WBC
PH UR STRIP: 6 (ref 5–8)
PLATELET # BLD AUTO: 205 K/UL (ref 150–400)
PMV BLD AUTO: 11.9 FL (ref 8.9–12.9)
POTASSIUM SERPL-SCNC: ABNORMAL MMOL/L (ref 3.5–5.1)
PROT SERPL-MCNC: 7.8 G/DL (ref 6.4–8.2)
PROT UR STRIP-MCNC: NEGATIVE MG/DL
RBC # BLD AUTO: 4.63 M/UL (ref 3.8–5.2)
RBC #/AREA URNS HPF: ABNORMAL /HPF (ref 0–5)
SODIUM SERPL-SCNC: 135 MMOL/L (ref 136–145)
SP GR UR REFRACTOMETRY: 1.02 (ref 1–1.03)
SPECIMEN HOLD: NORMAL
SPECIMEN HOLD: NORMAL
UROBILINOGEN UR QL STRIP.AUTO: 0.2 EU/DL (ref 0.2–1)
WBC # BLD AUTO: 4.6 K/UL (ref 3.6–11)
WBC URNS QL MICRO: ABNORMAL /HPF (ref 0–4)

## 2024-06-06 PROCEDURE — 83690 ASSAY OF LIPASE: CPT

## 2024-06-06 PROCEDURE — 85025 COMPLETE CBC W/AUTO DIFF WBC: CPT

## 2024-06-06 PROCEDURE — 36415 COLL VENOUS BLD VENIPUNCTURE: CPT

## 2024-06-06 PROCEDURE — 84703 CHORIONIC GONADOTROPIN ASSAY: CPT

## 2024-06-06 PROCEDURE — 74177 CT ABD & PELVIS W/CONTRAST: CPT

## 2024-06-06 PROCEDURE — 6370000000 HC RX 637 (ALT 250 FOR IP)

## 2024-06-06 PROCEDURE — 6360000004 HC RX CONTRAST MEDICATION: Performed by: RADIOLOGY

## 2024-06-06 PROCEDURE — 87086 URINE CULTURE/COLONY COUNT: CPT

## 2024-06-06 PROCEDURE — 82272 OCCULT BLD FECES 1-3 TESTS: CPT

## 2024-06-06 PROCEDURE — 6360000002 HC RX W HCPCS

## 2024-06-06 PROCEDURE — 99285 EMERGENCY DEPT VISIT HI MDM: CPT

## 2024-06-06 PROCEDURE — 81025 URINE PREGNANCY TEST: CPT

## 2024-06-06 PROCEDURE — 81001 URINALYSIS AUTO W/SCOPE: CPT

## 2024-06-06 PROCEDURE — 96374 THER/PROPH/DIAG INJ IV PUSH: CPT

## 2024-06-06 PROCEDURE — 2580000003 HC RX 258

## 2024-06-06 PROCEDURE — 80053 COMPREHEN METABOLIC PANEL: CPT

## 2024-06-06 RX ORDER — NITROFURANTOIN 25; 75 MG/1; MG/1
100 CAPSULE ORAL 2 TIMES DAILY
Qty: 10 CAPSULE | Refills: 0 | Status: SHIPPED | OUTPATIENT
Start: 2024-06-06 | End: 2024-06-11

## 2024-06-06 RX ORDER — KETOROLAC TROMETHAMINE 30 MG/ML
30 INJECTION, SOLUTION INTRAMUSCULAR; INTRAVENOUS
Status: COMPLETED | OUTPATIENT
Start: 2024-06-06 | End: 2024-06-06

## 2024-06-06 RX ORDER — 0.9 % SODIUM CHLORIDE 0.9 %
1000 INTRAVENOUS SOLUTION INTRAVENOUS ONCE
Status: COMPLETED | OUTPATIENT
Start: 2024-06-06 | End: 2024-06-06

## 2024-06-06 RX ORDER — NITROFURANTOIN 25; 75 MG/1; MG/1
100 CAPSULE ORAL
Status: COMPLETED | OUTPATIENT
Start: 2024-06-06 | End: 2024-06-06

## 2024-06-06 RX ADMIN — IOPAMIDOL 100 ML: 755 INJECTION, SOLUTION INTRAVENOUS at 15:02

## 2024-06-06 RX ADMIN — NITROFURANTOIN (MONOHYDRATE/MACROCRYSTALS) 100 MG: 75; 25 CAPSULE ORAL at 16:38

## 2024-06-06 RX ADMIN — SODIUM CHLORIDE 1000 ML: 9 INJECTION, SOLUTION INTRAVENOUS at 14:41

## 2024-06-06 RX ADMIN — KETOROLAC TROMETHAMINE 30 MG: 30 INJECTION, SOLUTION INTRAMUSCULAR at 14:41

## 2024-06-06 ASSESSMENT — PAIN DESCRIPTION - DESCRIPTORS
DESCRIPTORS: CRAMPING;ACHING
DESCRIPTORS: CRAMPING

## 2024-06-06 ASSESSMENT — PAIN DESCRIPTION - LOCATION
LOCATION: ABDOMEN
LOCATION: ABDOMEN

## 2024-06-06 ASSESSMENT — PAIN SCALES - GENERAL
PAINLEVEL_OUTOF10: 7
PAINLEVEL_OUTOF10: 5

## 2024-06-06 ASSESSMENT — ENCOUNTER SYMPTOMS
ANAL BLEEDING: 1
NAUSEA: 0
SHORTNESS OF BREATH: 0
ABDOMINAL PAIN: 1
VOMITING: 0

## 2024-06-06 ASSESSMENT — PAIN - FUNCTIONAL ASSESSMENT
PAIN_FUNCTIONAL_ASSESSMENT: 0-10
PAIN_FUNCTIONAL_ASSESSMENT: PREVENTS OR INTERFERES SOME ACTIVE ACTIVITIES AND ADLS
PAIN_FUNCTIONAL_ASSESSMENT: ACTIVITIES ARE NOT PREVENTED

## 2024-06-06 ASSESSMENT — PAIN DESCRIPTION - ORIENTATION
ORIENTATION: RIGHT;LEFT;MID;LOWER
ORIENTATION: RIGHT;LEFT

## 2024-06-06 NOTE — ED TRIAGE NOTES
Patient arrives to ED reports rectal bleeding yesterday, patient denies trying to have a BM at the time. Also reports abdominal cramping and lethargy.

## 2024-06-06 NOTE — DISCHARGE INSTRUCTIONS
Take the antibiotic as instructed and complete full course.     Follow-up closely with your primary care provider and a GI provider for reevaluation. Call for appointment as soon as possible.

## 2024-06-06 NOTE — ED PROVIDER NOTES
Appearance HAZY (*)     Ketones, Urine TRACE (*)     BACTERIA, URINE 3+ (*)     All other components within normal limits   URINE CULTURE HOLD SAMPLE   CULTURE, URINE   CBC WITH AUTO DIFFERENTIAL   OCCULT BLOOD, FECAL   LIPASE   EXTRA TUBES HOLD   HCG, SERUM, QUALITATIVE   POC PREGNANCY UR-QUAL   POC PREGNANCY UR-QUAL       All other labs were within normal range or not returned as of this dictation.    EMERGENCY DEPARTMENT COURSE and DIFFERENTIAL DIAGNOSIS/MDM:   Vitals:    Vitals:    06/06/24 1154   BP: 132/85   Pulse: 78   Resp: 18   Temp: 98.3 °F (36.8 °C)   TempSrc: Oral   SpO2: 99%   Weight: 94 kg (207 lb 3.7 oz)   Height: 1.524 m (5')       Medical Decision Making  Amount and/or Complexity of Data Reviewed  Labs: ordered. Decision-making details documented in ED Course.  Radiology: ordered.    Risk  Prescription drug management.    Patient is a 38-year-old female presenting to the emergency room complaining of red rectal bleeding x 1 yesterday morning.  External hemorrhoids were noted with rectal exam without any obvious bleeding.  No obvious anal fissure or mass were noted with rectal exam.  Fecal occult was also negative.  Doubt anemia requiring transfusion given hemoglobin hematocrit are within normal limits.  CT of the abdomen also did not show any acute abdominal process.  CT did note a 4 mm nonobstructing right renal calculus.  Patient was educated about the kidney stone noted.  UA was noted to have 3+ bacteria and patient did reports intermittent dysuria and difficulty with urination.  Given symptoms, patient started on Macrobid to help treat acute cystitis.  Patient is encouraged to follow-up closely with her primary care provider as well as a GI provider for reevaluation. Strict return to ED precautions given. ACI discussed with the patient; see instruction below. Patient verbalized understanding.      ED Course as of 06/06/24 2027   Thu Jun 06, 2024   1536 Color, UA: YELLOW/STRAW [JT]   1610 Pt

## 2024-06-06 NOTE — ED NOTES
Patient discharged by Keila SWEENEY with papers in hand and the understanding to follow up with GI

## 2024-06-07 LAB
BACTERIA SPEC CULT: NORMAL
SERVICE CMNT-IMP: NORMAL

## 2024-07-16 RX ORDER — FUROSEMIDE 40 MG/1
40 TABLET ORAL DAILY
Qty: 90 TABLET | Refills: 2 | Status: SHIPPED | OUTPATIENT
Start: 2024-07-16

## 2024-07-16 NOTE — TELEPHONE ENCOUNTER
Requested Prescriptions     Signed Prescriptions Disp Refills    furosemide (LASIX) 40 MG tablet 90 tablet 2     Sig: TAKE 1 TABLET BY MOUTH EVERY DAY     Authorizing Provider: LUCIO ORLANDO     Ordering User: JORDAN CERDA MD    Future Appointments   Date Time Provider Department Center   1/13/2025  1:20 PM Merrill Jose MD [facility]

## 2025-01-13 ENCOUNTER — OFFICE VISIT (OUTPATIENT)
Age: 40
End: 2025-01-13
Payer: COMMERCIAL

## 2025-01-13 VITALS
SYSTOLIC BLOOD PRESSURE: 128 MMHG | OXYGEN SATURATION: 99 % | HEIGHT: 60 IN | HEART RATE: 98 BPM | BODY MASS INDEX: 40.47 KG/M2 | DIASTOLIC BLOOD PRESSURE: 78 MMHG

## 2025-01-13 DIAGNOSIS — I49.3 PVC (PREMATURE VENTRICULAR CONTRACTION): ICD-10-CM

## 2025-01-13 DIAGNOSIS — R00.2 HEART PALPITATIONS: Primary | ICD-10-CM

## 2025-01-13 DIAGNOSIS — G47.33 OSA (OBSTRUCTIVE SLEEP APNEA): ICD-10-CM

## 2025-01-13 DIAGNOSIS — R60.0 BILATERAL LOWER EXTREMITY EDEMA: ICD-10-CM

## 2025-01-13 PROCEDURE — 99214 OFFICE O/P EST MOD 30 MIN: CPT | Performed by: INTERNAL MEDICINE

## 2025-01-13 PROCEDURE — 93000 ELECTROCARDIOGRAM COMPLETE: CPT | Performed by: INTERNAL MEDICINE

## 2025-01-13 RX ORDER — FUROSEMIDE 40 MG/1
40 TABLET ORAL DAILY
Qty: 90 TABLET | Refills: 3 | Status: SHIPPED | OUTPATIENT
Start: 2025-01-13

## 2025-01-13 RX ORDER — METOPROLOL SUCCINATE 25 MG/1
12.5 TABLET, EXTENDED RELEASE ORAL DAILY
Qty: 45 TABLET | Refills: 3 | Status: SHIPPED | OUTPATIENT
Start: 2025-01-13

## 2025-01-13 RX ORDER — FAMOTIDINE 40 MG/1
TABLET, FILM COATED ORAL
COMMUNITY

## 2025-01-13 ASSESSMENT — PATIENT HEALTH QUESTIONNAIRE - PHQ9
SUM OF ALL RESPONSES TO PHQ9 QUESTIONS 1 & 2: 0
2. FEELING DOWN, DEPRESSED OR HOPELESS: NOT AT ALL
1. LITTLE INTEREST OR PLEASURE IN DOING THINGS: NOT AT ALL
SUM OF ALL RESPONSES TO PHQ QUESTIONS 1-9: 0

## 2025-01-13 NOTE — PROGRESS NOTES
ipratropium (ATROVENT) 0.02 % nebulizer solution TAKE 1 NEBULE (0.5 MG) 4 TIMES A DAY AS NEEDED FOR SHORTNESS OF BREATH/WHEEZING.    Drospirenone (SLYND) 4 MG TABS     famotidine (PEPCID) 40 MG tablet Take by mouth     No current facility-administered medications for this visit.       Merrill Jose MD  [facility] heart and [facility] [place]  [address], [address]  [place] [address]

## 2025-03-24 ENCOUNTER — TELEPHONE (OUTPATIENT)
Age: 40
End: 2025-03-24

## 2025-03-24 DIAGNOSIS — R60.0 BILATERAL LOWER EXTREMITY EDEMA: ICD-10-CM

## 2025-03-24 DIAGNOSIS — R00.2 HEART PALPITATIONS: ICD-10-CM

## 2025-03-24 DIAGNOSIS — I49.3 PVC (PREMATURE VENTRICULAR CONTRACTION): ICD-10-CM

## 2025-03-24 RX ORDER — METOPROLOL SUCCINATE 25 MG/1
25 TABLET, EXTENDED RELEASE ORAL DAILY
Qty: 90 TABLET | Refills: 3 | Status: SHIPPED | OUTPATIENT
Start: 2025-03-24

## 2025-03-24 NOTE — TELEPHONE ENCOUNTER
----- Message from Dr. Merrill Jose MD sent at 3/21/2025  5:17 PM EDT -----  Pt ran out of BB, was taking full dose of toprol. Please refill this on Monday at 25 mg qd (was previously on 12.5). thx

## 2025-03-24 NOTE — TELEPHONE ENCOUNTER
Requested Prescriptions     Signed Prescriptions Disp Refills    metoprolol succinate (TOPROL XL) 25 MG extended release tablet 90 tablet 3     Sig: Take 1 tablet by mouth daily     Authorizing Provider: KRYSTYNA JOSE     Ordering User: JORDAN CERDA MD    Future Appointments   Date Time Provider Department Center   1/13/2026  1:20 PM Krystyna Jose MD [facility]

## 2025-04-10 ENCOUNTER — OFFICE VISIT (OUTPATIENT)
Age: 40
End: 2025-04-10

## 2025-04-10 VITALS
HEART RATE: 72 BPM | WEIGHT: 210.3 LBS | SYSTOLIC BLOOD PRESSURE: 130 MMHG | BODY MASS INDEX: 41.07 KG/M2 | TEMPERATURE: 98.3 F | OXYGEN SATURATION: 98 % | RESPIRATION RATE: 16 BRPM | DIASTOLIC BLOOD PRESSURE: 70 MMHG

## 2025-04-10 DIAGNOSIS — R07.81 RIB PAIN ON LEFT SIDE: Primary | ICD-10-CM

## 2025-04-10 RX ORDER — NYSTATIN 100000 [USP'U]/G
POWDER TOPICAL 2 TIMES DAILY
COMMUNITY
Start: 2025-02-25

## 2025-04-10 RX ORDER — LIDOCAINE 50 MG/G
1 PATCH TOPICAL DAILY
Qty: 10 PATCH | Refills: 0 | Status: SHIPPED | OUTPATIENT
Start: 2025-04-10 | End: 2025-04-20

## 2025-04-10 RX ORDER — BUPIVACAINE HYDROCHLORIDE 5 MG/ML
INJECTION, SOLUTION EPIDURAL; INTRACAUDAL; PERINEURAL
COMMUNITY
Start: 2025-03-06

## 2025-04-10 RX ORDER — IBUPROFEN 800 MG/1
TABLET, FILM COATED ORAL
COMMUNITY
Start: 2025-03-06

## 2025-04-10 RX ORDER — NYSTATIN AND TRIAMCINOLONE ACETONIDE 100000; 1 [USP'U]/G; MG/G
CREAM TOPICAL
COMMUNITY
Start: 2025-02-25

## 2025-04-10 NOTE — PROGRESS NOTES
Kasey Rosales (:  1985) is a 39 y.o. female,Established patient, here for evaluation of the following chief complaint(s):  Rib Pain (Abdominal pain fell and hit ribs on the left side /)      Assessment & Plan :  Visit Diagnoses and Associated Orders         Rib pain on left side    -  Primary    XR RIBS LEFT INCLUDE CHEST (MIN 3 VIEWS) [75539 CPT(R)]   - Future Order    lidocaine (LIDODERM) 5 % [24790]           ORDERS WITHOUT AN ASSOCIATED DIAGNOSIS    BUPivacaine, PF, (SENSORCAINE-MPF) 0.5 % injection [315691]      ibuprofen (ADVIL;MOTRIN) 800 MG tablet [3845]      nystatin (MYCOSTATIN) 536834 UNIT/GM powder [18649]      nystatin-triamcinolone (MYCOLOG II) 686267-8.1 UNIT/GM-% cream [5754]            We will obtain an x-ray to rule out a fracture.    I believe you may have a fracture to one of your ribs.  However I am still waiting for the final read from the radiologist.  You can call the clinic tomorrow and find out what this is.    The treatment for this is going to be the same regardless of if there is a fracture or not.    Lidocaine patches 12 hours on 12 hours off (if this is not covered by your insurance, you can get the lidocaine 4% patches over-the-counter)  Abdominal binder or brace to help with the pain  Ibuprofen 600 mg every 6 hours    Subjective :  HPI     39 y.o. female presents with left rib and side pain that began after a fall on 3/16/2025.  She fell and hit her lower rib and upper abdomen directly on a drying machine.  She is continuing to have significant pain.  Any kind of pressure or palpation to the area makes it hurt.  Movement makes it hurt coughing makes it hurt anything that increases pressure in the area makes it hurt.  She had been taking Tylenol and ibuprofen for the GYN surgery which masked her pain initially she thinks.  However it has continued and she is concerned.         Vitals:    04/10/25 1902   BP: 130/70   BP Site: Right Upper Arm   Patient Position: Sitting

## 2025-04-10 NOTE — PATIENT INSTRUCTIONS
We will obtain an x-ray to rule out a fracture.    I believe you may have a fracture to one of your ribs.  However I am still waiting for the final read from the radiologist.  You can call the clinic tomorrow and find out what this is.    The treatment for this is going to be the same regardless of if there is a fracture or not.    Lidocaine patches 12 hours on 12 hours off (if this is not covered by your insurance, you can get the lidocaine 4% patches over-the-counter)  Abdominal binder or brace to help with the pain  Ibuprofen 600 mg every 6 hours

## 2025-04-25 ENCOUNTER — APPOINTMENT (OUTPATIENT)
Facility: HOSPITAL | Age: 40
End: 2025-04-25
Payer: COMMERCIAL

## 2025-04-25 ENCOUNTER — HOSPITAL ENCOUNTER (EMERGENCY)
Facility: HOSPITAL | Age: 40
Discharge: HOME | End: 2025-04-25
Attending: EMERGENCY MEDICINE
Payer: COMMERCIAL

## 2025-04-25 VITALS
BODY MASS INDEX: 43.06 KG/M2 | SYSTOLIC BLOOD PRESSURE: 133 MMHG | HEART RATE: 78 BPM | OXYGEN SATURATION: 100 % | RESPIRATION RATE: 16 BRPM | TEMPERATURE: 98.6 F | DIASTOLIC BLOOD PRESSURE: 88 MMHG | WEIGHT: 220.46 LBS

## 2025-04-25 DIAGNOSIS — R07.81 RIB PAIN ON LEFT SIDE: Primary | ICD-10-CM

## 2025-04-25 DIAGNOSIS — N93.9 VAGINAL BLEEDING: ICD-10-CM

## 2025-04-25 LAB
ALBUMIN SERPL-MCNC: 3.6 G/DL (ref 3.5–5)
ALBUMIN/GLOB SERPL: 0.9 (ref 1.1–2.2)
ALP SERPL-CCNC: 98 U/L (ref 45–117)
ALT SERPL-CCNC: 20 U/L (ref 12–78)
ANION GAP SERPL CALC-SCNC: 4 MMOL/L (ref 2–12)
APPEARANCE UR: ABNORMAL
AST SERPL-CCNC: 14 U/L (ref 15–37)
BACTERIA URNS QL MICRO: NEGATIVE /HPF
BASOPHILS # BLD: 0.03 K/UL (ref 0–0.1)
BASOPHILS NFR BLD: 0.5 % (ref 0–1)
BILIRUB SERPL-MCNC: 0.5 MG/DL (ref 0.2–1)
BILIRUB UR QL: NEGATIVE
BUN SERPL-MCNC: 12 MG/DL (ref 6–20)
BUN/CREAT SERPL: 12 (ref 12–20)
CALCIUM SERPL-MCNC: 9.4 MG/DL (ref 8.5–10.1)
CHLORIDE SERPL-SCNC: 106 MMOL/L (ref 97–108)
CO2 SERPL-SCNC: 27 MMOL/L (ref 21–32)
COLOR UR: ABNORMAL
COMMENT:: NORMAL
CREAT SERPL-MCNC: 0.97 MG/DL (ref 0.55–1.02)
DIFFERENTIAL METHOD BLD: NORMAL
EOSINOPHIL # BLD: 0.11 K/UL (ref 0–0.4)
EOSINOPHIL NFR BLD: 2 % (ref 0–7)
EPITH CASTS URNS QL MICRO: ABNORMAL /LPF
ERYTHROCYTE [DISTWIDTH] IN BLOOD BY AUTOMATED COUNT: 14.4 % (ref 11.5–14.5)
GLOBULIN SER CALC-MCNC: 4.2 G/DL (ref 2–4)
GLUCOSE SERPL-MCNC: 98 MG/DL (ref 65–100)
GLUCOSE UR STRIP.AUTO-MCNC: NEGATIVE MG/DL
HCG UR QL: NEGATIVE
HCT VFR BLD AUTO: 38.5 % (ref 35–47)
HGB BLD-MCNC: 11.9 G/DL (ref 11.5–16)
HGB UR QL STRIP: ABNORMAL
IMM GRANULOCYTES # BLD AUTO: 0.01 K/UL (ref 0–0.04)
IMM GRANULOCYTES NFR BLD AUTO: 0.2 % (ref 0–0.5)
KETONES UR QL STRIP.AUTO: NEGATIVE MG/DL
LEUKOCYTE ESTERASE UR QL STRIP.AUTO: ABNORMAL
LIPASE SERPL-CCNC: 22 U/L (ref 13–75)
LYMPHOCYTES # BLD: 2.01 K/UL (ref 0.8–3.5)
LYMPHOCYTES NFR BLD: 36 % (ref 12–49)
MCH RBC QN AUTO: 26.7 PG (ref 26–34)
MCHC RBC AUTO-ENTMCNC: 30.9 G/DL (ref 30–36.5)
MCV RBC AUTO: 86.3 FL (ref 80–99)
MONOCYTES # BLD: 0.36 K/UL (ref 0–1)
MONOCYTES NFR BLD: 6.5 % (ref 5–13)
NEUTS SEG # BLD: 3.06 K/UL (ref 1.8–8)
NEUTS SEG NFR BLD: 54.8 % (ref 32–75)
NITRITE UR QL STRIP.AUTO: NEGATIVE
NRBC # BLD: 0 K/UL (ref 0–0.01)
NRBC BLD-RTO: 0 PER 100 WBC
PH UR STRIP: 5.5 (ref 5–8)
PLATELET # BLD AUTO: 212 K/UL (ref 150–400)
PMV BLD AUTO: 11.9 FL (ref 8.9–12.9)
POTASSIUM SERPL-SCNC: 4 MMOL/L (ref 3.5–5.1)
PROT SERPL-MCNC: 7.8 G/DL (ref 6.4–8.2)
PROT UR STRIP-MCNC: 30 MG/DL
RBC # BLD AUTO: 4.46 M/UL (ref 3.8–5.2)
RBC #/AREA URNS HPF: >100 /HPF (ref 0–5)
SODIUM SERPL-SCNC: 137 MMOL/L (ref 136–145)
SP GR UR REFRACTOMETRY: 1.02 (ref 1–1.03)
SPECIMEN HOLD: NORMAL
SPECIMEN HOLD: NORMAL
UROBILINOGEN UR QL STRIP.AUTO: 0.2 EU/DL (ref 0.2–1)
WBC # BLD AUTO: 5.6 K/UL (ref 3.6–11)
WBC URNS QL MICRO: ABNORMAL /HPF (ref 0–4)

## 2025-04-25 PROCEDURE — 81001 URINALYSIS AUTO W/SCOPE: CPT

## 2025-04-25 PROCEDURE — 81025 URINE PREGNANCY TEST: CPT

## 2025-04-25 PROCEDURE — 76830 TRANSVAGINAL US NON-OB: CPT

## 2025-04-25 PROCEDURE — 76856 US EXAM PELVIC COMPLETE: CPT

## 2025-04-25 PROCEDURE — 99284 EMERGENCY DEPT VISIT MOD MDM: CPT

## 2025-04-25 PROCEDURE — 74176 CT ABD & PELVIS W/O CONTRAST: CPT

## 2025-04-25 PROCEDURE — 83690 ASSAY OF LIPASE: CPT

## 2025-04-25 PROCEDURE — 85025 COMPLETE CBC W/AUTO DIFF WBC: CPT

## 2025-04-25 PROCEDURE — 80053 COMPREHEN METABOLIC PANEL: CPT

## 2025-04-25 ASSESSMENT — PAIN DESCRIPTION - LOCATION: LOCATION: ABDOMEN

## 2025-04-25 ASSESSMENT — PAIN SCALES - GENERAL: PAINLEVEL_OUTOF10: 6

## 2025-04-25 ASSESSMENT — PAIN - FUNCTIONAL ASSESSMENT
PAIN_FUNCTIONAL_ASSESSMENT: PREVENTS OR INTERFERES SOME ACTIVE ACTIVITIES AND ADLS
PAIN_FUNCTIONAL_ASSESSMENT: 0-10

## 2025-04-25 ASSESSMENT — PAIN DESCRIPTION - ORIENTATION: ORIENTATION: UPPER

## 2025-04-25 ASSESSMENT — PAIN DESCRIPTION - DESCRIPTORS: DESCRIPTORS: OTHER (COMMENT)

## 2025-04-25 NOTE — ED PROVIDER NOTES
[facility] EMERGENCY DEPARTMENT  EMERGENCY DEPARTMENT ENCOUNTER      Pt Name: Kasey Rosales  MRN: [id number]  Birthdate 1985  Date of evaluation: 4/25/2025  Provider: Katia Addison PA-C    CHIEF COMPLAINT       Chief Complaint   Patient presents with    Abdominal Pain         HISTORY OF PRESENT ILLNESS   (Location/Symptom, Timing/Onset, Context/Setting, Quality, Duration, Modifying Factors, Severity)  Note limiting factors.   Patient is a 39-year-old female presenting for left upper quadrant/left rib pain as well as lower abdominal pain.  She states that she had her tubes removed in march via surgery by her OB/GYN she states that she was having bleeding at the time.  She says that after her surgery she had a fall where she fell and hit her dryer on the corner and this fractured the left side of her ribs and she was seen a couple weeks ago for that and diagnosed with a hairline fracture of the ribs.  She states that she is still having some soreness and pain from that but she thinks that it is healing.  Now she states that for the last 3 to 4 weeks she has also been having vaginal bleeding which started darker in color and now has proceeded to be bright red in color.  She sees Dr. Thalia Marin at [facility] [place].  She says that over the last 3 to 4 weeks she has not followed up with her OB/GYN about this issue.  She states that she is filling a couple pads a day. Denies having a heavy period because she is on BC and she usually doesn't have periods.  Says that sometimes over the last couple days she has been spotting.  She does admit to intermittent nausea but denies any fever.  She also admits to being a little bit more fatigued than usual.  Denies any vomiting or diarrhea.  Denies chance of pregnancy based on the fact that she had her tubes tied and she is on birth control.  Denies any urinary symptoms and denies any back pain.            Review of External Medical Records:     Nursing Notes  within normal limits which is a measure of pancreatic enzyme that could have been causing your pain but is reassuring that it is not. The CT scan of the abdomen and pelvis did not show any signs of acute process. There are findings of small bilateral stones in the kidneys that are not of concern at this time as they are nonobstructing but just something that exist in your body normally. On the ultrasound there was a hypoechoic mass in the endometrial stripe which could represent either an endometrial polyp, submucosal fibroid, or possibly blood products. As we discussed please follow-up with your OB/GYN for further investigation and management of this. You may use Tylenol in the meantime for your pain and a heating pad. Please come back to the emergency department with any concerns or worsening symptoms.    Presentation, management, and disposition were discussed with the attending physician, Dr. Maldonado who is in agreement with plan of care.     Amount and/or Complexity of Data Reviewed  Labs: ordered.  Radiology: ordered.            REASSESSMENT            CONSULTS:  None    PROCEDURES:  Unless otherwise noted below, none     Procedures      FINAL IMPRESSION      1. Rib pain on left side    2. Vaginal bleeding          DISPOSITION/PLAN   DISPOSITION Decision To Discharge 04/25/2025 02:13:15 PM      PATIENT REFERRED TO:  [facility]  [address] [place] [address] [phone]  Go to   As needed, If symptoms worsen    Your OBGYN    Schedule an appointment as soon as possible for a visit   [place]  [address]  [facility] [address] [phone]            DISCHARGE MEDICATIONS:  Discharge Medication List as of 4/25/2025  2:15 PM            (Please note that portions of this note were completed with a voice recognition program.  Efforts were made to edit the dictations but occasionally words are mis-transcribed.)    Katia Addison PA-C

## 2025-04-25 NOTE — ED TRIAGE NOTES
Pt had her tubes removed in March, after the surgery hit her left abd on the corner of [facility], seen at Urgent care and dx with hairline fracture of rib , pt with intermittent vaginal bleeding since , also c/o fatigue, +nausea, denies fever

## 2025-04-25 NOTE — DISCHARGE INSTRUCTIONS
The left-sided pain that you are having is likely related to the healing hairline fracture of the ribs that you suffered from before.  Your urinalysis today did show signs of blood for which we completed a CT scan in addition to the ultrasound.  Your CBC was within normal limits which is reassuring and your CMP did not show any emergent etiologies of concern.  Your lipase was within normal limits which is a measure of pancreatic enzyme that could have been causing your pain but is reassuring that it is not.  The CT scan of the abdomen and pelvis did not show any signs of acute process.  There are findings of small bilateral stones in the kidneys that are not of concern at this time as they are nonobstructing but just something that exist in your body normally.  On the ultrasound there was a hypoechoic mass in the endometrial stripe which could represent either an endometrial polyp, submucosal fibroid, or possibly blood products.  As we discussed please follow-up with your OB/GYN for further investigation and management of this.  You may use Tylenol in the meantime for your pain and a heating pad.  Please come back to the emergency department with any concerns or worsening symptoms.    Thank you for allowing us to provide you with medical care today.  We realize that you have many choices for your emergency care needs.  We thank you for choosing [facility].  Please choose us in the future for any continued health care needs.     The exam and treatment you received in the Emergency Department were for an emergent problem and are not intended as complete care. It is important that you follow up with a doctor, nurse practitioner, or physician assistant for ongoing care. If your symptoms worsen or you do not improve as expected and you are unable to reach your usual health care provider, you should return to the Emergency Department. We are available 24 hours a day.     Please make an appointment with your healthcare

## 2025-06-08 ENCOUNTER — APPOINTMENT (OUTPATIENT)
Facility: HOSPITAL | Age: 40
End: 2025-06-08
Payer: COMMERCIAL

## 2025-06-08 ENCOUNTER — HOSPITAL ENCOUNTER (EMERGENCY)
Facility: HOSPITAL | Age: 40
Discharge: HOME | End: 2025-06-08
Attending: EMERGENCY MEDICINE
Payer: COMMERCIAL

## 2025-06-08 VITALS
HEART RATE: 80 BPM | SYSTOLIC BLOOD PRESSURE: 128 MMHG | DIASTOLIC BLOOD PRESSURE: 68 MMHG | OXYGEN SATURATION: 100 % | WEIGHT: 214.51 LBS | TEMPERATURE: 98.8 F | RESPIRATION RATE: 16 BRPM | BODY MASS INDEX: 41.89 KG/M2

## 2025-06-08 DIAGNOSIS — M54.50 ACUTE BILATERAL LOW BACK PAIN WITHOUT SCIATICA: Primary | ICD-10-CM

## 2025-06-08 PROCEDURE — 6360000002 HC RX W HCPCS: Performed by: EMERGENCY MEDICINE

## 2025-06-08 PROCEDURE — 72100 X-RAY EXAM L-S SPINE 2/3 VWS: CPT

## 2025-06-08 PROCEDURE — 96372 THER/PROPH/DIAG INJ SC/IM: CPT

## 2025-06-08 PROCEDURE — 99284 EMERGENCY DEPT VISIT MOD MDM: CPT

## 2025-06-08 RX ORDER — KETOROLAC TROMETHAMINE 30 MG/ML
30 INJECTION, SOLUTION INTRAMUSCULAR; INTRAVENOUS
Status: COMPLETED | OUTPATIENT
Start: 2025-06-08 | End: 2025-06-08

## 2025-06-08 RX ORDER — CYCLOBENZAPRINE HCL 10 MG
10 TABLET ORAL 3 TIMES DAILY PRN
Qty: 21 TABLET | Refills: 0 | Status: SHIPPED | OUTPATIENT
Start: 2025-06-08 | End: 2025-06-18

## 2025-06-08 RX ORDER — METHYLPREDNISOLONE 4 MG/1
TABLET ORAL
Qty: 1 KIT | Refills: 0 | Status: SHIPPED | OUTPATIENT
Start: 2025-06-08 | End: 2025-06-14

## 2025-06-08 RX ADMIN — KETOROLAC TROMETHAMINE 30 MG: 30 INJECTION, SOLUTION INTRAMUSCULAR; INTRAVENOUS at 12:37

## 2025-06-08 ASSESSMENT — PAIN DESCRIPTION - ORIENTATION
ORIENTATION: LOWER
ORIENTATION: LOWER

## 2025-06-08 ASSESSMENT — PAIN - FUNCTIONAL ASSESSMENT
PAIN_FUNCTIONAL_ASSESSMENT: PREVENTS OR INTERFERES SOME ACTIVE ACTIVITIES AND ADLS
PAIN_FUNCTIONAL_ASSESSMENT: PREVENTS OR INTERFERES SOME ACTIVE ACTIVITIES AND ADLS
PAIN_FUNCTIONAL_ASSESSMENT: 0-10

## 2025-06-08 ASSESSMENT — PAIN DESCRIPTION - DESCRIPTORS
DESCRIPTORS: ACHING;CRAMPING
DESCRIPTORS: ACHING;SORE

## 2025-06-08 ASSESSMENT — PAIN SCALES - GENERAL
PAINLEVEL_OUTOF10: 8
PAINLEVEL_OUTOF10: 7

## 2025-06-08 ASSESSMENT — PAIN DESCRIPTION - LOCATION
LOCATION: BACK
LOCATION: BACK

## 2025-06-08 NOTE — ED TRIAGE NOTES
Pt was lifting a box yesterday and felt a strain in her lower back. Pt states she has felt come spasms. Took motrin this morning with no relief. No urinary difficulty.

## 2025-06-08 NOTE — ED PROVIDER NOTES
[place] [address] [phone]    Schedule an appointment as soon as possible for a visit         DISCHARGE MEDICATIONS:  Discharge Medication List as of 6/8/2025  1:52 PM        START taking these medications    Details   cyclobenzaprine (FLEXERIL) 10 MG tablet Take 1 tablet by mouth 3 times daily as needed for Muscle spasms, Disp-21 tablet, R-0Normal      methylPREDNISolone (MEDROL, MARY KAY,) 4 MG tablet Take by mouth., Disp-1 kit, R-0Normal               (Please note that portions of this note were completed with a voice recognition program.  Efforts were made to edit the dictations but occasionally words are mis-transcribed.)    Mariangel Pedroza MD (electronically signed)  Emergency Attending Physician            Mariangel Pedroza MD  06/08/25 2000

## 2025-07-09 ENCOUNTER — APPOINTMENT (OUTPATIENT)
Facility: HOSPITAL | Age: 40
End: 2025-07-09
Payer: COMMERCIAL

## 2025-07-09 ENCOUNTER — HOSPITAL ENCOUNTER (EMERGENCY)
Facility: HOSPITAL | Age: 40
Discharge: HOME | End: 2025-07-09
Attending: STUDENT IN AN ORGANIZED HEALTH CARE EDUCATION/TRAINING PROGRAM
Payer: COMMERCIAL

## 2025-07-09 VITALS
HEIGHT: 61 IN | BODY MASS INDEX: 40.53 KG/M2 | SYSTOLIC BLOOD PRESSURE: 145 MMHG | TEMPERATURE: 98.8 F | RESPIRATION RATE: 17 BRPM | DIASTOLIC BLOOD PRESSURE: 95 MMHG | HEART RATE: 84 BPM | OXYGEN SATURATION: 99 %

## 2025-07-09 DIAGNOSIS — R07.9 CHEST PAIN, UNSPECIFIED TYPE: Primary | ICD-10-CM

## 2025-07-09 LAB
ALBUMIN SERPL-MCNC: 3.4 G/DL (ref 3.5–5)
ALBUMIN/GLOB SERPL: 0.9 (ref 1.1–2.2)
ALP SERPL-CCNC: 91 U/L (ref 45–117)
ALT SERPL-CCNC: 27 U/L (ref 12–78)
ANION GAP SERPL CALC-SCNC: 6 MMOL/L (ref 2–12)
AST SERPL-CCNC: 20 U/L (ref 15–37)
BASOPHILS # BLD: 0.05 K/UL (ref 0–0.1)
BASOPHILS NFR BLD: 0.7 % (ref 0–1)
BILIRUB SERPL-MCNC: 0.3 MG/DL (ref 0.2–1)
BUN SERPL-MCNC: 13 MG/DL (ref 6–20)
BUN/CREAT SERPL: 13 (ref 12–20)
CALCIUM SERPL-MCNC: 9.3 MG/DL (ref 8.5–10.1)
CHLORIDE SERPL-SCNC: 104 MMOL/L (ref 97–108)
CO2 SERPL-SCNC: 27 MMOL/L (ref 21–32)
CREAT SERPL-MCNC: 1 MG/DL (ref 0.55–1.02)
D DIMER PPP FEU-MCNC: 0.97 MG/L FEU (ref 0–0.65)
DIFFERENTIAL METHOD BLD: NORMAL
EKG ATRIAL RATE: 93 BPM
EKG DIAGNOSIS: NORMAL
EKG P AXIS: 38 DEGREES
EKG P-R INTERVAL: 152 MS
EKG Q-T INTERVAL: 330 MS
EKG QRS DURATION: 80 MS
EKG QTC CALCULATION (BAZETT): 410 MS
EKG R AXIS: 33 DEGREES
EKG T AXIS: 12 DEGREES
EKG VENTRICULAR RATE: 93 BPM
EOSINOPHIL # BLD: 0.09 K/UL (ref 0–0.4)
EOSINOPHIL NFR BLD: 1.3 % (ref 0–7)
ERYTHROCYTE [DISTWIDTH] IN BLOOD BY AUTOMATED COUNT: 13.6 % (ref 11.5–14.5)
GLOBULIN SER CALC-MCNC: 3.9 G/DL (ref 2–4)
GLUCOSE SERPL-MCNC: 131 MG/DL (ref 65–100)
HCG UR QL: NEGATIVE
HCT VFR BLD AUTO: 38.6 % (ref 35–47)
HGB BLD-MCNC: 11.9 G/DL (ref 11.5–16)
IMM GRANULOCYTES # BLD AUTO: 0.02 K/UL (ref 0–0.04)
IMM GRANULOCYTES NFR BLD AUTO: 0.3 % (ref 0–0.5)
LIPASE SERPL-CCNC: 35 U/L (ref 13–75)
LYMPHOCYTES # BLD: 2.55 K/UL (ref 0.8–3.5)
LYMPHOCYTES NFR BLD: 36.8 % (ref 12–49)
MAGNESIUM SERPL-MCNC: 2.1 MG/DL (ref 1.6–2.4)
MCH RBC QN AUTO: 27.3 PG (ref 26–34)
MCHC RBC AUTO-ENTMCNC: 30.8 G/DL (ref 30–36.5)
MCV RBC AUTO: 88.5 FL (ref 80–99)
MONOCYTES # BLD: 0.64 K/UL (ref 0–1)
MONOCYTES NFR BLD: 9.2 % (ref 5–13)
NEUTS SEG # BLD: 3.58 K/UL (ref 1.8–8)
NEUTS SEG NFR BLD: 51.7 % (ref 32–75)
NRBC # BLD: 0 K/UL (ref 0–0.01)
NRBC BLD-RTO: 0 PER 100 WBC
PLATELET # BLD AUTO: 223 K/UL (ref 150–400)
PMV BLD AUTO: 11.5 FL (ref 8.9–12.9)
POTASSIUM SERPL-SCNC: 3.7 MMOL/L (ref 3.5–5.1)
PROT SERPL-MCNC: 7.3 G/DL (ref 6.4–8.2)
RBC # BLD AUTO: 4.36 M/UL (ref 3.8–5.2)
SODIUM SERPL-SCNC: 137 MMOL/L (ref 136–145)
TROPONIN I SERPL HS-MCNC: 5 NG/L (ref 0–51)
TROPONIN I SERPL HS-MCNC: 5 NG/L (ref 0–51)
WBC # BLD AUTO: 6.9 K/UL (ref 3.6–11)

## 2025-07-09 PROCEDURE — 84484 ASSAY OF TROPONIN QUANT: CPT

## 2025-07-09 PROCEDURE — 93010 ELECTROCARDIOGRAM REPORT: CPT | Performed by: INTERNAL MEDICINE

## 2025-07-09 PROCEDURE — 83690 ASSAY OF LIPASE: CPT

## 2025-07-09 PROCEDURE — 83735 ASSAY OF MAGNESIUM: CPT

## 2025-07-09 PROCEDURE — 99285 EMERGENCY DEPT VISIT HI MDM: CPT

## 2025-07-09 PROCEDURE — 85025 COMPLETE CBC W/AUTO DIFF WBC: CPT

## 2025-07-09 PROCEDURE — 71275 CT ANGIOGRAPHY CHEST: CPT

## 2025-07-09 PROCEDURE — 85379 FIBRIN DEGRADATION QUANT: CPT

## 2025-07-09 PROCEDURE — 6360000004 HC RX CONTRAST MEDICATION: Performed by: STUDENT IN AN ORGANIZED HEALTH CARE EDUCATION/TRAINING PROGRAM

## 2025-07-09 PROCEDURE — 96374 THER/PROPH/DIAG INJ IV PUSH: CPT

## 2025-07-09 PROCEDURE — 6360000002 HC RX W HCPCS

## 2025-07-09 PROCEDURE — 93005 ELECTROCARDIOGRAM TRACING: CPT | Performed by: STUDENT IN AN ORGANIZED HEALTH CARE EDUCATION/TRAINING PROGRAM

## 2025-07-09 PROCEDURE — 80053 COMPREHEN METABOLIC PANEL: CPT

## 2025-07-09 PROCEDURE — 71046 X-RAY EXAM CHEST 2 VIEWS: CPT

## 2025-07-09 PROCEDURE — 81025 URINE PREGNANCY TEST: CPT

## 2025-07-09 RX ORDER — IOPAMIDOL 755 MG/ML
100 INJECTION, SOLUTION INTRAVASCULAR
Status: COMPLETED | OUTPATIENT
Start: 2025-07-09 | End: 2025-07-09

## 2025-07-09 RX ORDER — KETOROLAC TROMETHAMINE 30 MG/ML
15 INJECTION, SOLUTION INTRAMUSCULAR; INTRAVENOUS ONCE
Status: COMPLETED | OUTPATIENT
Start: 2025-07-09 | End: 2025-07-09

## 2025-07-09 RX ADMIN — KETOROLAC TROMETHAMINE 15 MG: 30 INJECTION, SOLUTION INTRAMUSCULAR; INTRAVENOUS at 01:25

## 2025-07-09 RX ADMIN — IOPAMIDOL 70 ML: 755 INJECTION, SOLUTION INTRAVENOUS at 02:48

## 2025-07-09 ASSESSMENT — PAIN DESCRIPTION - FREQUENCY: FREQUENCY: CONTINUOUS

## 2025-07-09 ASSESSMENT — PAIN DESCRIPTION - DIRECTION: RADIATING_TOWARDS: RIGHT SIDE OF JAW

## 2025-07-09 ASSESSMENT — LIFESTYLE VARIABLES
HOW OFTEN DO YOU HAVE A DRINK CONTAINING ALCOHOL: NEVER
HOW MANY STANDARD DRINKS CONTAINING ALCOHOL DO YOU HAVE ON A TYPICAL DAY: PATIENT DOES NOT DRINK

## 2025-07-09 ASSESSMENT — PAIN SCALES - GENERAL: PAINLEVEL_OUTOF10: 7

## 2025-07-09 ASSESSMENT — PAIN DESCRIPTION - LOCATION: LOCATION: CHEST;JAW

## 2025-07-09 ASSESSMENT — HEART SCORE
ECG: NORMAL
ECG: NON-SPECIFC REPOLARIZATION DISTURBANCE/LBTB/PM

## 2025-07-09 ASSESSMENT — PAIN DESCRIPTION - DESCRIPTORS: DESCRIPTORS: ACHING

## 2025-07-09 ASSESSMENT — PAIN DESCRIPTION - PAIN TYPE: TYPE: ACUTE PAIN

## 2025-07-09 ASSESSMENT — PAIN DESCRIPTION - ONSET: ONSET: ON-GOING

## 2025-07-09 ASSESSMENT — PAIN DESCRIPTION - ORIENTATION: ORIENTATION: MID;RIGHT

## 2025-07-09 NOTE — ED PROVIDER NOTES
management.            REASSESSMENT     ED Course as of 07/09/25 0324   Wed Jul 09, 2025   0140 ECG performed at 0053 shows normal sinus rhythm, ventricular rate 93, normal axis, normal intervals no STEMI.  But T waves in lead III [WG]      ED Course User Index  [WG] Gokul Melendez DO           CONSULTS:  None    PROCEDURES:  Unless otherwise noted below, none     Procedures      FINAL IMPRESSION      1. Chest pain, unspecified type          DISPOSITION/PLAN   DISPOSITION        PATIENT REFERRED TO:  [place]  [id number] [address]  [facility] [address] [phone]    Go to   If symptoms worsen    [facility]  [address] [place] [id number] [phone]  Go to   If symptoms worsen      DISCHARGE MEDICATIONS:  New Prescriptions    No medications on file         (Please note that portions of this note were completed with a voice recognition program.  Efforts were made to edit the dictations but occasionally words are mis-transcribed.)    Yoli Castellon PA-C (electronically signed)  Emergency Attending Physician / Physician Assistant / Nurse Practitioner             Yoli Castellon PA-C  07/09/25 0324

## 2025-07-09 NOTE — ED TRIAGE NOTES
Pt walked into the Ed with chest pain. Pt states she was resting at home when she started to have chest pain that radiates th her left side of her jaw. Pt took acid reflex meds and tylenol but denies relief. Additional sx are weakness in both arm and palpations. Denies SOB, dizziness, nor n/v/d.     PMH: danicat

## 2025-07-09 NOTE — ED NOTES
Patient discharged by DO Desiree Melendez pt sent to the front lobby, with strong and steady gait, no acute distress noted at time of discharge - Discharge information / home RX / and reasons to return to the ED were reviewed by the ED provider.

## 2025-07-09 NOTE — DISCHARGE INSTRUCTIONS
Today, you were seen in the ER for chest pain. Your EKG, chest x-ray, and bloodwork were reassuring.  Take medications as prescribed at discharge. However, things can change, and you should return to the ER or call 911 if you have: worsening of your chest pain, difficulty breathing, or any other new or concerning symptoms, as these may be a sign of a new problem or worsening of your condition. Please follow-up with your primary doctor within 1-2 days for re-evaluation. We also recommend that you follow up with your cardiologist.

## 2025-07-09 NOTE — ED NOTES
Change of shift. Care of patient taken over from Purnima Castellon physician assistant; H&P reviewed, handoff complete.    Patient here for chest pain, has had 2 cardiac troponins which were not elevated otherwise reassuring laboratory studies, CTA shows no evidence of PE or acute abnormalities.  Stable for discharge home outpatient workup.  Recommended following up with her cardiologist, heart score 2, stable for discharge home outpatient follow-up.    LABORATORY TESTS:  Recent Results (from the past 12 hours)   EKG 12 Lead    Collection Time: 07/09/25 12:53 AM   Result Value Ref Range    Ventricular Rate 93 BPM    Atrial Rate 93 BPM    P-R Interval 152 ms    QRS Duration 80 ms    Q-T Interval 330 ms    QTc Calculation (Bazett) 410 ms    P Axis 38 degrees    R Axis 33 degrees    T Axis 12 degrees    Diagnosis       Normal sinus rhythm  Cannot rule out Anterior infarct , age undetermined  Abnormal ECG  No previous ECGs available     Comprehensive Metabolic Panel    Collection Time: 07/09/25  1:08 AM   Result Value Ref Range    Sodium 137 136 - 145 mmol/L    Potassium 3.7 3.5 - 5.1 mmol/L    Chloride 104 97 - 108 mmol/L    CO2 27 21 - 32 mmol/L    Anion Gap 6 2 - 12 mmol/L    Glucose 131 (H) 65 - 100 mg/dL    BUN 13 6 - 20 MG/DL    Creatinine 1.00 0.55 - 1.02 MG/DL    BUN/Creatinine Ratio 13 12 - 20      Est, Glom Filt Rate 73 >60 ml/min/1.73m2    Calcium 9.3 8.5 - 10.1 MG/DL    Total Bilirubin 0.3 0.2 - 1.0 MG/DL    ALT 27 12 - 78 U/L    AST 20 15 - 37 U/L    Alk Phosphatase 91 45 - 117 U/L    Total Protein 7.3 6.4 - 8.2 g/dL    Albumin 3.4 (L) 3.5 - 5.0 g/dL    Globulin 3.9 2.0 - 4.0 g/dL    Albumin/Globulin Ratio 0.9 (L) 1.1 - 2.2     Troponin    Collection Time: 07/09/25  1:08 AM   Result Value Ref Range    Troponin, High Sensitivity 5 0 - 51 ng/L   CBC with Auto Differential    Collection Time: 07/09/25  1:08 AM   Result Value Ref Range    WBC 6.9 3.6 - 11.0 K/uL    RBC 4.36 3.80 - 5.20 M/uL    Hemoglobin 11.9 11.5 -